# Patient Record
Sex: FEMALE | Race: WHITE | NOT HISPANIC OR LATINO | Employment: UNEMPLOYED
[De-identification: names, ages, dates, MRNs, and addresses within clinical notes are randomized per-mention and may not be internally consistent; named-entity substitution may affect disease eponyms.]

---

## 2017-09-24 ENCOUNTER — HEALTH MAINTENANCE LETTER (OUTPATIENT)
Age: 27
End: 2017-09-24

## 2019-03-27 ENCOUNTER — OFFICE VISIT (OUTPATIENT)
Dept: FAMILY MEDICINE | Facility: CLINIC | Age: 29
End: 2019-03-27

## 2019-03-27 VITALS
BODY MASS INDEX: 25.54 KG/M2 | DIASTOLIC BLOOD PRESSURE: 66 MMHG | TEMPERATURE: 96.3 F | SYSTOLIC BLOOD PRESSURE: 108 MMHG | HEART RATE: 87 BPM | WEIGHT: 135.3 LBS | RESPIRATION RATE: 18 BRPM | HEIGHT: 61 IN | OXYGEN SATURATION: 99 %

## 2019-03-27 DIAGNOSIS — E06.3 HASHIMOTO'S THYROIDITIS: Primary | ICD-10-CM

## 2019-03-27 DIAGNOSIS — F32.A ANXIETY AND DEPRESSION: ICD-10-CM

## 2019-03-27 DIAGNOSIS — F41.9 ANXIETY AND DEPRESSION: ICD-10-CM

## 2019-03-27 LAB
T4 FREE SERPL-MCNC: 1.02 NG/DL (ref 0.76–1.46)
TSH SERPL DL<=0.005 MIU/L-ACNC: 2.21 MU/L (ref 0.4–4)

## 2019-03-27 PROCEDURE — 36415 COLL VENOUS BLD VENIPUNCTURE: CPT | Performed by: NURSE PRACTITIONER

## 2019-03-27 PROCEDURE — 84439 ASSAY OF FREE THYROXINE: CPT | Performed by: NURSE PRACTITIONER

## 2019-03-27 PROCEDURE — 99203 OFFICE O/P NEW LOW 30 MIN: CPT | Performed by: NURSE PRACTITIONER

## 2019-03-27 PROCEDURE — 84443 ASSAY THYROID STIM HORMONE: CPT | Performed by: NURSE PRACTITIONER

## 2019-03-27 RX ORDER — SERTRALINE HYDROCHLORIDE 100 MG/1
TABLET, FILM COATED ORAL
Qty: 45 TABLET | Refills: 3 | Status: SHIPPED | OUTPATIENT
Start: 2019-03-27 | End: 2024-01-09

## 2019-03-27 ASSESSMENT — ANXIETY QUESTIONNAIRES
3. WORRYING TOO MUCH ABOUT DIFFERENT THINGS: SEVERAL DAYS
6. BECOMING EASILY ANNOYED OR IRRITABLE: MORE THAN HALF THE DAYS
2. NOT BEING ABLE TO STOP OR CONTROL WORRYING: MORE THAN HALF THE DAYS
GAD7 TOTAL SCORE: 14
7. FEELING AFRAID AS IF SOMETHING AWFUL MIGHT HAPPEN: MORE THAN HALF THE DAYS
IF YOU CHECKED OFF ANY PROBLEMS ON THIS QUESTIONNAIRE, HOW DIFFICULT HAVE THESE PROBLEMS MADE IT FOR YOU TO DO YOUR WORK, TAKE CARE OF THINGS AT HOME, OR GET ALONG WITH OTHER PEOPLE: SOMEWHAT DIFFICULT
5. BEING SO RESTLESS THAT IT IS HARD TO SIT STILL: NEARLY EVERY DAY
1. FEELING NERVOUS, ANXIOUS, OR ON EDGE: SEVERAL DAYS

## 2019-03-27 ASSESSMENT — MIFFLIN-ST. JEOR: SCORE: 1271.1

## 2019-03-27 ASSESSMENT — PATIENT HEALTH QUESTIONNAIRE - PHQ9
SUM OF ALL RESPONSES TO PHQ QUESTIONS 1-9: 17
5. POOR APPETITE OR OVEREATING: NEARLY EVERY DAY

## 2019-03-27 NOTE — PROGRESS NOTES
"  SUBJECTIVE:   Mary Morrison is a 28 year old female who presents to clinic today for the following health issues:    Chief Complaint   Patient presents with     [facility] Care     Just moved back from [place], has Hashimotos, has not been on her levothyroxine, liothyrionine or zoloft  for the past 3 months      Refill Request     Zoloft      Depression and Anxiety Follow-Up    Status since last visit: Worsened- has been off of Zoloft for 3 months, has been crying a lot and having mood swings     Other associated symptoms:None    Complicating factors:     Significant life event: Yes-  Moving      Current substance abuse: None    PHQ 3/27/2019   PHQ-9 Total Score 17   Q9: Suicide Ideation Not at all     CHARLINE-7 SCORE 12/15/2011 3/27/2019   Total Score 13 -   Total Score - 14     In the past two weeks have you had thoughts of suicide or self-harm?  No.    Do you have concerns about your personal safety or the safety of others?   No  PHQ-9  English  PHQ-9   Any Language  CHARLINE-7  Suicide Assessment Five-step Evaluation and Treatment (SAFE-T)    Amount of exercise or physical activity: 6-7 days/week for an average of greater than 60 minutes    Problems taking medications regularly: Yes,  Has been out of her meds     Medication side effects: none    Diet: regular (no restrictions)    Problem list and histories reviewed & adjusted, as indicated.  Additional history: as documented    Labs reviewed in EPIC    Reviewed and updated as needed this visit by clinical staff  Tobacco  Allergies  Meds  Med Hx  Surg Hx  Fam Hx  Soc Hx      Reviewed and updated as needed this visit by Provider         ROS:  Constitutional, HEENT, cardiovascular, pulmonary, gi and gu systems are negative, except as otherwise noted.    OBJECTIVE:     /66 (BP Location: Left arm, Patient Position: Sitting, Cuff Size: Adult Regular)   Pulse 87   Temp 96.3  F (35.7  C) (Tympanic)   Resp 18   Ht 1.549 m (5' 1\")   Wt 61.4 kg (135 lb 4.8 oz)  "  LMP  (LMP Unknown)   SpO2 99%   BMI 25.56 kg/m    Body mass index is 25.56 kg/m .  GENERAL: healthy, alert and no distress  CV: regular rate and rhythm, normal S1 S2, no S3 or S4, no murmur, click or rub, no peripheral edema and peripheral pulses strong  PSYCH: mentation appears normal, affect normal/bright    Diagnostic Test Results:  Results for orders placed or performed in visit on 03/27/19 (from the past 24 hour(s))   TSH   Result Value Ref Range    TSH 2.21 0.40 - 4.00 mU/L   T4, free   Result Value Ref Range    T4 Free 1.02 0.76 - 1.46 ng/dL       ASSESSMENT/PLAN:     1. Hashimoto's thyroiditis  -labs are normal, was on Synthroid 112 mcg, but stopped about 3 months ago because she run out, clinically euthyroid except for symptoms of dry skin   -no need for current replacement, recommended to repeat thyroid levels in 2 months and follow up with endocrinologist   - TSH  - T4, free  - ENDOCRINOLOGY ADULT REFERRAL  - TSH with free T4 reflex FUTURE 2mo; Future    2. Anxiety and depression  -restart Zoloft  - sertraline (ZOLOFT) 100 MG tablet; Take 50 mg for 1 week, than increase to 100 mg for 2 weeks and than start 150 mg daily  Dispense: 45 tablet; Refill: 3    See Patient Instructions    AISHA Lujan [facility]

## 2019-03-28 ASSESSMENT — ANXIETY QUESTIONNAIRES: GAD7 TOTAL SCORE: 14

## 2019-09-16 ENCOUNTER — TELEPHONE (OUTPATIENT)
Dept: FAMILY MEDICINE | Facility: CLINIC | Age: 29
End: 2019-09-16

## 2019-09-16 NOTE — LETTER
September 18, 2019      Mary Morrison  [id number]  [place] [facility] [address]        Dear Mary,     We have been unable to reach you by phone, It appears the phone number we have listed to reach you is non working.  Your [facility] Team works hard to make sure that you receive exceptional care. Enclosed you will find a copy of the phq-9/gad7 depression form  that our clinic uses to monitor and manage your Depression/anxiety  This test is an assessment tool that we can use to determine how well your Depression  is controlled. Please fill out and mail back the enclosed  form. Enclosed is a stamped addressed envelope for you to mail the form  back to the clinic in.Thank You     Also , your provider would like you to schedule a lab appointment to have your thyroid labs re checked. This can be scheduled by calling [phone].     In order to ensure we are providing the best quality care, we have reviewed your chart and see that you are due for: A pappe/physical.   Please call the clinic at your earliest convenience to schedule an appointment, and update your current contact information.     If you are receiving your care at another facility or have had this test done elsewhere,  please let us know so we can update our records.        Sincerely,        Kacey Anthony MD

## 2019-09-16 NOTE — TELEPHONE ENCOUNTER
"  Panel Management Review      Patient has the following on her problem list:     Depression / Dysthymia review    Measure:  Needs PHQ-9 score of 4 or less during index window.  Administer PHQ-9 and if score is 5 or more, send encounter to provider for next steps.    4 -8 month window range: 7/29/19-11/26/19    PHQ-9 SCORE 9/6/2011 12/15/2011 3/27/2019   PHQ-9 Total Score 2 8 -   PHQ-9 Total Score - - 17       If PHQ-9 recheck is 5 or more, route to provider for next steps.    Patient is due for:  PHQ9      Composite cancer screening  Chart review shows that this patient is due/due soon for the following None  Summary:    Patient is due/failing the following:   PHQ9    Action needed:   Patient needs to do PHQ9.  Patient is due to come in for thyroid labs.    Type of outreach:    No answer at number listed, \"number not taking calls\".    Questions for provider review:    None                                                                                                                                    RICHAR Alberto MA       Chart routed to Care Team .          "

## 2019-09-18 NOTE — TELEPHONE ENCOUNTER
Panel Management Review      Patient has the following on her problem list:     Depression / Dysthymia review    Measure:  Needs PHQ-9 score of 4 or less during index window.  Administer PHQ-9 and if score is 5 or more, send encounter to provider for next steps.    4 -8 month window range: 7/29/19-11/26/19    PHQ-9 SCORE 9/6/2011 12/15/2011 3/27/2019   PHQ-9 Total Score 2 8 -   PHQ-9 Total Score - - 17       If PHQ-9 recheck is 5 or more, route to provider for next steps.    Patient is due for:  PHQ9      Composite cancer screening  Chart review shows that this patient is due/due soon for the following Pap Smear  Summary:    Patient is due/failing the following:   Thyroid labs , PAP and PHQ9      Type of outreach:    Attempted to reach patient at # listed. # not taking calls.Letter was sent with phq-9/gad7 enclosed for her to fill out and mail back . ALso mailed lab reminders.    Jono SHETH CMA

## 2019-11-09 ENCOUNTER — HEALTH MAINTENANCE LETTER (OUTPATIENT)
Age: 29
End: 2019-11-09

## 2020-02-23 ENCOUNTER — HEALTH MAINTENANCE LETTER (OUTPATIENT)
Age: 30
End: 2020-02-23

## 2020-12-06 ENCOUNTER — HEALTH MAINTENANCE LETTER (OUTPATIENT)
Age: 30
End: 2020-12-06

## 2021-09-26 ENCOUNTER — HEALTH MAINTENANCE LETTER (OUTPATIENT)
Age: 31
End: 2021-09-26

## 2022-01-15 ENCOUNTER — HEALTH MAINTENANCE LETTER (OUTPATIENT)
Age: 32
End: 2022-01-15

## 2023-04-23 ENCOUNTER — HEALTH MAINTENANCE LETTER (OUTPATIENT)
Age: 33
End: 2023-04-23

## 2023-11-21 ENCOUNTER — HOSPITAL ENCOUNTER (EMERGENCY)
Facility: CLINIC | Age: 33
Discharge: HOME | End: 2023-11-21
Admitting: EMERGENCY MEDICINE
Payer: MEDICAID

## 2023-11-21 ENCOUNTER — HOSPITAL ENCOUNTER (EMERGENCY)
Facility: CLINIC | Age: 33
End: 2023-11-21
Payer: MEDICAID

## 2023-11-21 VITALS
HEIGHT: 61 IN | DIASTOLIC BLOOD PRESSURE: 89 MMHG | TEMPERATURE: 97.9 F | WEIGHT: 200 LBS | BODY MASS INDEX: 37.76 KG/M2 | OXYGEN SATURATION: 100 % | HEART RATE: 75 BPM | SYSTOLIC BLOOD PRESSURE: 132 MMHG | RESPIRATION RATE: 18 BRPM

## 2023-11-21 PROCEDURE — 99281 EMR DPT VST MAYX REQ PHY/QHP: CPT

## 2023-11-21 NOTE — ED TRIAGE NOTES
Patient reports she was seen in clinic ~ 2 months ago for same complaint, was to have U/S but did not r/t insurance issues. Patient reports sx continue. Sx include urinary incontinence, diarrhea. Just prior to bowel movement and urination feels stabbing pain and bloating.     Triage Assessment (Adult)       Row Name 11/21/23 1253          Triage Assessment    Airway WDL WDL        Respiratory WDL    Respiratory WDL WDL        Skin Circulation/Temperature WDL    Skin Circulation/Temperature WDL WDL        Cardiac WDL    Cardiac WDL WDL        Peripheral/Neurovascular WDL    Peripheral Neurovascular WDL WDL        Cognitive/Neuro/Behavioral WDL    Cognitive/Neuro/Behavioral WDL WDL

## 2023-11-22 ENCOUNTER — APPOINTMENT (OUTPATIENT)
Dept: CT IMAGING | Facility: CLINIC | Age: 33
End: 2023-11-22
Attending: EMERGENCY MEDICINE
Payer: MEDICAID

## 2023-11-22 ENCOUNTER — HOSPITAL ENCOUNTER (EMERGENCY)
Facility: CLINIC | Age: 33
Discharge: HOME | End: 2023-11-22
Attending: EMERGENCY MEDICINE | Admitting: EMERGENCY MEDICINE
Payer: MEDICAID

## 2023-11-22 VITALS
RESPIRATION RATE: 18 BRPM | OXYGEN SATURATION: 97 % | WEIGHT: 196.8 LBS | TEMPERATURE: 97.5 F | HEART RATE: 70 BPM | HEIGHT: 60 IN | SYSTOLIC BLOOD PRESSURE: 123 MMHG | BODY MASS INDEX: 38.64 KG/M2 | DIASTOLIC BLOOD PRESSURE: 81 MMHG

## 2023-11-22 DIAGNOSIS — R10.30 LOWER ABDOMINAL PAIN: ICD-10-CM

## 2023-11-22 DIAGNOSIS — N30.00 ACUTE CYSTITIS WITHOUT HEMATURIA: ICD-10-CM

## 2023-11-22 LAB
ALBUMIN SERPL BCG-MCNC: 4.3 G/DL (ref 3.5–5.2)
ALBUMIN UR-MCNC: 30 MG/DL
ALP SERPL-CCNC: 88 U/L (ref 40–150)
ALT SERPL W P-5'-P-CCNC: 14 U/L (ref 0–50)
ANION GAP SERPL CALCULATED.3IONS-SCNC: 15 MMOL/L (ref 7–15)
APPEARANCE UR: CLEAR
AST SERPL W P-5'-P-CCNC: 12 U/L (ref 0–45)
BILIRUB SERPL-MCNC: 0.2 MG/DL
BILIRUB UR QL STRIP: NEGATIVE
BUN SERPL-MCNC: 5.3 MG/DL (ref 6–20)
CALCIUM SERPL-MCNC: 9.2 MG/DL (ref 8.6–10)
CHLORIDE SERPL-SCNC: 102 MMOL/L (ref 98–107)
COLOR UR AUTO: YELLOW
CREAT SERPL-MCNC: 0.78 MG/DL (ref 0.51–0.95)
DEPRECATED HCO3 PLAS-SCNC: 21 MMOL/L (ref 22–29)
EGFRCR SERPLBLD CKD-EPI 2021: >90 ML/MIN/1.73M2
GLUCOSE SERPL-MCNC: 92 MG/DL (ref 70–99)
GLUCOSE UR STRIP-MCNC: NEGATIVE MG/DL
HGB UR QL STRIP: NEGATIVE
HOLD SPECIMEN: NORMAL
KETONES UR STRIP-MCNC: 80 MG/DL
LEUKOCYTE ESTERASE UR QL STRIP: NEGATIVE
LIPASE SERPL-CCNC: 12 U/L (ref 13–60)
MUCOUS THREADS #/AREA URNS LPF: PRESENT /LPF
NITRATE UR QL: NEGATIVE
PH UR STRIP: 6 [PH] (ref 5–7)
POTASSIUM SERPL-SCNC: 3.6 MMOL/L (ref 3.4–5.3)
PROT SERPL-MCNC: 7.5 G/DL (ref 6.4–8.3)
RBC URINE: 2 /HPF
SODIUM SERPL-SCNC: 138 MMOL/L (ref 135–145)
SP GR UR STRIP: 1.02 (ref 1–1.03)
SQUAMOUS EPITHELIAL: <1 /HPF
UROBILINOGEN UR STRIP-MCNC: 2 MG/DL
WBC URINE: 1 /HPF

## 2023-11-22 PROCEDURE — 96360 HYDRATION IV INFUSION INIT: CPT | Performed by: EMERGENCY MEDICINE

## 2023-11-22 PROCEDURE — 87086 URINE CULTURE/COLONY COUNT: CPT | Performed by: EMERGENCY MEDICINE

## 2023-11-22 PROCEDURE — 250N000011 HC RX IP 250 OP 636: Performed by: EMERGENCY MEDICINE

## 2023-11-22 PROCEDURE — 83690 ASSAY OF LIPASE: CPT | Mod: 59 | Performed by: EMERGENCY MEDICINE

## 2023-11-22 PROCEDURE — 99284 EMERGENCY DEPT VISIT MOD MDM: CPT | Performed by: EMERGENCY MEDICINE

## 2023-11-22 PROCEDURE — 36415 COLL VENOUS BLD VENIPUNCTURE: CPT | Performed by: EMERGENCY MEDICINE

## 2023-11-22 PROCEDURE — 74177 CT ABD & PELVIS W/CONTRAST: CPT

## 2023-11-22 PROCEDURE — 99285 EMERGENCY DEPT VISIT HI MDM: CPT | Mod: 25 | Performed by: EMERGENCY MEDICINE

## 2023-11-22 PROCEDURE — 81001 URINALYSIS AUTO W/SCOPE: CPT | Performed by: EMERGENCY MEDICINE

## 2023-11-22 PROCEDURE — 258N000003 HC RX IP 258 OP 636: Performed by: EMERGENCY MEDICINE

## 2023-11-22 PROCEDURE — 250N000011 HC RX IP 250 OP 636: Performed by: STUDENT IN AN ORGANIZED HEALTH CARE EDUCATION/TRAINING PROGRAM

## 2023-11-22 PROCEDURE — 250N000009 HC RX 250: Performed by: EMERGENCY MEDICINE

## 2023-11-22 PROCEDURE — 96361 HYDRATE IV INFUSION ADD-ON: CPT | Performed by: EMERGENCY MEDICINE

## 2023-11-22 PROCEDURE — 80053 COMPREHEN METABOLIC PANEL: CPT | Mod: 59 | Performed by: EMERGENCY MEDICINE

## 2023-11-22 RX ORDER — ONDANSETRON 4 MG/1
4 TABLET, ORALLY DISINTEGRATING ORAL EVERY 8 HOURS PRN
Qty: 10 TABLET | Refills: 0 | Status: SHIPPED | OUTPATIENT
Start: 2023-11-22 | End: 2024-01-09

## 2023-11-22 RX ORDER — ONDANSETRON 4 MG/1
4 TABLET, ORALLY DISINTEGRATING ORAL ONCE
Status: COMPLETED | OUTPATIENT
Start: 2023-11-22 | End: 2023-11-22

## 2023-11-22 RX ORDER — IOPAMIDOL 755 MG/ML
96 INJECTION, SOLUTION INTRAVASCULAR ONCE
Status: COMPLETED | OUTPATIENT
Start: 2023-11-22 | End: 2023-11-22

## 2023-11-22 RX ORDER — OXYCODONE HYDROCHLORIDE 5 MG/1
5 TABLET ORAL EVERY 6 HOURS PRN
Qty: 6 TABLET | Refills: 0 | Status: SHIPPED | OUTPATIENT
Start: 2023-11-22 | End: 2024-01-09

## 2023-11-22 RX ORDER — CEPHALEXIN 500 MG/1
1000 CAPSULE ORAL 2 TIMES DAILY
Qty: 20 CAPSULE | Refills: 0 | Status: SHIPPED | OUTPATIENT
Start: 2023-11-22 | End: 2024-01-09

## 2023-11-22 RX ADMIN — SODIUM CHLORIDE 64 ML: 9 INJECTION, SOLUTION INTRAVENOUS at 20:29

## 2023-11-22 RX ADMIN — SODIUM CHLORIDE, POTASSIUM CHLORIDE, SODIUM LACTATE AND CALCIUM CHLORIDE 1000 ML: 600; 310; 30; 20 INJECTION, SOLUTION INTRAVENOUS at 19:50

## 2023-11-22 RX ADMIN — ONDANSETRON 4 MG: 4 TABLET, ORALLY DISINTEGRATING ORAL at 19:39

## 2023-11-22 RX ADMIN — IOPAMIDOL 96 ML: 755 INJECTION, SOLUTION INTRAVENOUS at 20:29

## 2023-11-22 ASSESSMENT — ACTIVITIES OF DAILY LIVING (ADL): ADLS_ACUITY_SCORE: 35

## 2023-11-23 NOTE — DISCHARGE INSTRUCTIONS
I am not certain what is causing her pain.  It may be related to a bladder infection.  I am going to treat you with antibiotics for the next 5 days.  Return for worsening symptoms, repeated vomiting, or other concerns.  Otherwise follow-up in clinic.    Use ibuprofen and acetaminophen for your symptoms.  Use oxycodone as needed for pain that is not controlled by the prior two medications.    Oxycodone is an addictive opioid medication, please only take it when the pain is more than can be controlled by acetaminophen or ibuprofen alone. It will also make you lightheaded, nauseated, and constipated.  Do not drive, operate heavy machinery, or take care of young children while taking this medication. Do not mix it with other medications or drugs that will make you sleepy, such as alcohol.     Repeated studies have shown that the longer you use opioid pain medications, the longer it is until you return to normal function. It is our recommendation that you taper off opioids as quickly as possible with the goal of returning to normal function or near normal function. Long term use of opioids quickly results in growing tolerance to the medication (less of the benefits) and increased dependence (more of the bad side effects).     Pain is very difficult to treat and we can very rarely take away pain completely. If you are having difficulty with pain over several weeks after an injury, you may need to start different medications and therapies (such as physical therapy, graded exercise, massage, and acupuncture). Please talk about this with your regular doctor.     If you are interested in seeking free, confidential treatment referral and information service for individuals and families facing mental health and/or substance use disorders please call [phone]-Certify ([phone])

## 2023-11-23 NOTE — ED TRIAGE NOTES
"Has had \"weeks and weeks\" of loose stools over the past 3 days has had pain nausea vomiting and more loose stools.  Went to doctor today at [facility] had xray and labs \"nothing found\" got home had more vomiting and  insisted she come in.    Reports some abd discomfort all the time when she tries to go to the bathroom has sharp stabbing cramp from belly button down that is \"excruciating\"      Moving about freely walking fully upright texting on phone during triage    Triage Assessment (Adult)       Row Name 11/22/23 1360          Triage Assessment    Airway WDL WDL        Respiratory WDL    Respiratory WDL WDL        Skin Circulation/Temperature WDL    Skin Circulation/Temperature WDL WDL        Cardiac WDL    Cardiac WDL WDL        Peripheral/Neurovascular WDL    Peripheral Neurovascular WDL WDL        Cognitive/Neuro/Behavioral WDL    Cognitive/Neuro/Behavioral WDL WDL                     "

## 2023-11-23 NOTE — ED PROVIDER NOTES
History     Chief Complaint   Patient presents with    GI Problem     HPI  Mary Morrison is a 33 year old female who presents for abdominal pain.  The patient says that she has been having sharp stabbing severe abdominal pain off and on over the past several days.  Located in the lower abdomen.  Pain is often triggered by needing to have a bowel movement or urinates.  No fevers.  She does have nausea and has had several episodes of vomiting.  She reports nonbloody loose stools.  No dysuria but she has urinary frequency and urgency.  No chest pain or difficulty breathing or cough.    I reviewed the patient's clinic visit from 11/22/2023, earlier today.  She had normal electrolytes and LFTs.  Normal lipase and amylase.  Normal CRP.  White blood cell count of 10.1.  Urinalysis without definite signs of infection.  Urine pregnancy test was negative.    Allergies:  Allergies   Allergen Reactions    Ketorolac Tromethamine Other (See Comments)     Comment: vomiting , Description:     Morphine Other (See Comments)     Comment: Confusion, Description:        Problem List:    Patient Active Problem List    Diagnosis Date Noted    Anxiety 08/01/2011     Priority: Medium    Psychiatric disorders 07/23/2011     Priority: Medium     1. Major depressive disorder, recurrent, severe without psychotic features. 2. Alcohol dependency versus abuse.   AXIS II: Borderline personality disorder.   AXIS III: Self-inflicted cuts on wrist.   AXIS IV: Long history of mental illness.   AXIS V: Current Global Assessment of Functioning of 50.  Problem list name updated by automated process. Provider to review and confirm      Health Care Home 04/19/2011     Priority: Medium     High priority patient - 4/19/11    DX V65.8 REPLACED WITH [id number] C[facility] HOME (04/08/2013)      CARDIOVASCULAR SCREENING; LDL GOAL LESS THAN 160 03/22/2011     Priority: Medium    Thyroid enlargement 01/06/2011     Priority: Medium    Knee pain 09/28/2010      Priority: Medium    LGSIL on Pap smear 2010     Priority: Medium     09:Pap--LSIL. Repeat pap PP. (ALAYNA 7/17/10).  8/23/10:pap--LSIL. Recommend colposcopy.  9/21/10:Colpo--benign. Repeat pap 6 months. Reminder placed in EPIC.  11: Pap - NIL. Repeat pap postpartum  11:Pap--NIL. Rpt pap in 1 year with [facility]. In HM, ep, prob list, hx updated. Reminder sent.    13: Patient failed follow-up. Pap tracking file closed.               Past Medical History:    Past Medical History:   Diagnosis Date    Anxiety     Chickenpox     Papanicolaou smear of cervix with low grade squamous intraepithelial lesion (LGSIL) ,     Urinary tract infections        Past Surgical History:    Past Surgical History:   Procedure Laterality Date    ARTHROSCOPY OF JOINT UNLISTED       SECTION  2011    Procedure: SECTION;  Section; Surgeon:ALIZA AMADOR; Location:[place] OR     TOOTH EXTRACTION W/FORCEP         Family History:    Family History   Problem Relation Age of Onset    C.A.D. Father         stents    Hypertension Father     Cancer Father         testicular cancer    Alcohol/Drug Father         alcohol & drug    Depression Father     Genitourinary Problems Father         kidney stone history    Psychotic Disorder Father     Gynecology Mother         hysterectomy-endometreosis    Hypertension Maternal Grandmother     Breast Cancer Maternal Grandmother     Arthritis Maternal Grandmother     Gynecology Maternal Grandmother         hysterectomy    Hypertension Maternal Grandfather     Arthritis Maternal Grandfather     Arthritis Paternal Grandmother     Osteoporosis Paternal Grandmother     Asthma Paternal Grandfather     Hypertension Paternal Grandfather     Arthritis Paternal Grandfather     Lipids Paternal Grandfather     Obesity Paternal Grandfather     Respiratory Paternal Grandfather         COPD    Cancer - colorectal Paternal Grandfather     Asthma Brother        Social  History:  Marital Status:  Single [1]  Social History     Tobacco Use    Smoking status: Every Day     Packs/day: 0.50     Years: 2.00     Additional pack years: 0.00     Total pack years: 1.00     Types: Cigarettes    Smokeless tobacco: Never    Tobacco comments:     10-12 cigarettes a day    Substance Use Topics    Alcohol use: No    Drug use: No        Medications:    cephALEXin (KEFLEX) 500 MG capsule  ondansetron (ZOFRAN ODT) 4 MG ODT tab  oxyCODONE (ROXICODONE) 5 MG tablet  sertraline (ZOLOFT) 100 MG tablet          Review of Systems    Physical Exam   BP: 120/79  Pulse: 79  Temp: 97.5  F (36.4  C)  Resp: 18  Height: 152.4 cm (5')  Weight: 89.3 kg (196 lb 12.8 oz)  SpO2: 97 %      Physical Exam  Constitutional:       General: She is in acute distress.      Appearance: Normal appearance. She is well-developed.   HENT:      Head: Normocephalic and atraumatic.   Eyes:      General: No scleral icterus.     Conjunctiva/sclera: Conjunctivae normal.   Cardiovascular:      Rate and Rhythm: Normal rate.   Pulmonary:      Effort: Pulmonary effort is normal. No respiratory distress.   Abdominal:      General: Abdomen is flat.      Tenderness: There is abdominal tenderness. There is no guarding or rebound.   Musculoskeletal:      Cervical back: Normal range of motion and neck supple.   Skin:     General: Skin is warm and dry.      Findings: No rash.   Neurological:      Mental Status: She is alert and oriented to person, place, and time.         ED Course                 Procedures              Critical Care time:  none               Results for orders placed or performed during the hospital encounter of 11/22/23 (from the past 24 hour(s))   [facility] Draw    Narrative    The following orders were created for panel order [facility] Draw.  Procedure                               Abnormality         Status                     ---------                               -----------         ------                     Extra Blue Top  Tube[[id number]]                              Final result               Extra Green Top (Lithium...[[id number]]                      Final result               Extra Purple Top Tube[[id number]]                            Final result                 Please view results for these tests on the individual orders.   Extra Blue Top Tube   Result Value Ref Range    Hold Specimen JIC    Extra Green Top (Lithium Heparin) Tube   Result Value Ref Range    Hold Specimen JIC    Extra Purple Top Tube   Result Value Ref Range    Hold Specimen JIC    Lipase   Result Value Ref Range    Lipase 12 (L) 13 - 60 U/L   Comprehensive metabolic panel   Result Value Ref Range    Sodium 138 135 - 145 mmol/L    Potassium 3.6 3.4 - 5.3 mmol/L    Carbon Dioxide (CO2) 21 (L) 22 - 29 mmol/L    Anion Gap 15 7 - 15 mmol/L    Urea Nitrogen 5.3 (L) 6.0 - 20.0 mg/dL    Creatinine 0.78 0.51 - 0.95 mg/dL    GFR Estimate >90 >60 mL/min/1.73m2    Calcium 9.2 8.6 - 10.0 mg/dL    Chloride 102 98 - 107 mmol/L    Glucose 92 70 - 99 mg/dL    Alkaline Phosphatase 88 40 - 150 U/L    AST 12 0 - 45 U/L    ALT 14 0 - 50 U/L    Protein Total 7.5 6.4 - 8.3 g/dL    Albumin 4.3 3.5 - 5.2 g/dL    Bilirubin Total 0.2 <=1.2 mg/dL   CT Abdomen Pelvis w Contrast    Narrative    EXAM: CT ABDOMEN PELVIS W CONTRAST  LOCATION: [facility]  DATE: 11/22/2023    INDICATION: left lower abdominal pain  COMPARISON: CT 10/12/2020  TECHNIQUE: CT scan of the abdomen and pelvis was performed following injection of IV contrast. Multiplanar reformats were obtained. Dose reduction techniques were used.  CONTRAST: 96mL Isovue 370    FINDINGS:   LOWER CHEST: Normal.    HEPATOBILIARY: Normal variant localized fatty infiltration in the left lobe liver near the ligament of teres. The liver is otherwise normal. The gallbladder, portal veins, and bile ducts are normal.    PANCREAS: Normal.    SPLEEN: Normal.    ADRENAL GLANDS: Normal.    KIDNEYS/BLADDER: There is slight  circumferential thickening with mild stranding adjacent fat involving the bladder wall which is nonspecific, can be seen with findings of cystitis and if this is of concern clinically then a urinalysis would be of   benefit. Minute simple cyst left kidney with no follow-up recommended. Persistent fetal lobulation seen in the both kidneys which is a normal variant.    BOWEL: Normal to include the appendix.    LYMPH NODES: Normal.    VASCULATURE: Unremarkable.    PELVIC ORGANS: There are 3 tiny cysts seen in the right ovary with the largest measuring 1.1 cm in greatest dimension. These be most typical for follicles. The uterus and left ovary are normal.    MUSCULOSKELETAL: Minute fatty umbilical hernia with no associated inflammation or bowel.      Impression    IMPRESSION:   1.  Mild circumferential thickening with slight stranding adjacent fat involving the bladder, if changes of cystitis is of concern, then a urinalysis is recommended for further evaluation.    2.  3 tiny cysts in the right ovary most typical for follicles.    3.  Normal variant localized fatty infiltration in the left lobe liver  4.  minute teres.   Urine Macroscopic with reflex to Microscopic   Result Value Ref Range    Color Urine Yellow Colorless, Straw, Light Yellow, Yellow    Appearance Urine Clear Clear    Glucose Urine Negative Negative mg/dL    Bilirubin Urine Negative Negative    Ketones Urine 80 (A) Negative mg/dL    Specific Gravity Urine 1.020 1.003 - 1.035    Blood Urine Negative Negative    pH Urine 6.0 5.0 - 7.0    Protein Albumin Urine 30 (A) Negative mg/dL    Urobilinogen Urine 2.0 Normal, 2.0 mg/dL    Nitrite Urine Negative Negative    Leukocyte Esterase Urine Negative Negative    RBC Urine 2 <=2 /HPF    WBC Urine 1 <=5 /HPF    Squamous Epithelials Urine <1 <=1 /HPF    Mucus Urine Present (A) None Seen /LPF       Medications   ondansetron (ZOFRAN ODT) ODT tab 4 mg (4 mg Oral $Given 11/22/23 1939)   lactated ringers BOLUS 1,000 mL  (1,000 mLs Intravenous $New Bag 11/22/23 1950)   iopamidol (ISOVUE-370) solution 96 mL (96 mLs Intravenous $Given 11/22/23 2029)   sodium chloride 0.9 % bag 500mL for CT scan flush use (64 mLs Intravenous $Given 11/22/23 2029)       Assessments & Plan (with Medical Decision Making)   33-year-old female presents for abdominal pain.  Vital signs are reassuring.  She is given IV ondansetron and fluids.  Urinalysis is positive for ketones.  Otherwise no definite signs of infection.  Urine culture is pending.  Electrolytes are within normal limits.  LFTs are normal, no signs of hepatitis.  Lipase is normal, no signs of pancreatitis.  CT of the abdomen and pelvis obtained, images interpreted independently as well as radiology read reviewed, no signs of bowel obstruction, diverticulitis, or appendicitis.  She does have some inflammation in thickening of her bladder wall, possibly related to cystitis.  Given the patient's discomfort, her urinary urgency and frequency and the findings on the CT scan I will trial treatment for acute cystitis with cephalexin.  She is safe to discharge home with a prescription for cephalexin as well as a short course of oxycodone and ondansetron.  She is told to return if worse, otherwise follow-up in clinic.  The patient is in agreement with this plan.    I have reviewed the nursing notes.    I have reviewed the findings, diagnosis, plan and need for follow up with the patient.         New Prescriptions    CEPHALEXIN (KEFLEX) 500 MG CAPSULE    Take 2 capsules (1,000 mg) by mouth 2 times daily    ONDANSETRON (ZOFRAN ODT) 4 MG ODT TAB    Take 1 tablet (4 mg) by mouth every 8 hours as needed for nausea    OXYCODONE (ROXICODONE) 5 MG TABLET    Take 1 tablet (5 mg) by mouth every 6 hours as needed for severe pain       Final diagnoses:   Acute cystitis without hematuria   Lower abdominal pain       11/22/2023   [facility] EMERGENCY DEPT       Danny Bermudez MD  11/22/23  [id number]

## 2023-11-24 LAB — BACTERIA UR CULT: NORMAL

## 2023-12-27 ENCOUNTER — APPOINTMENT (OUTPATIENT)
Dept: CT IMAGING | Facility: CLINIC | Age: 33
End: 2023-12-27
Attending: FAMILY MEDICINE
Payer: COMMERCIAL

## 2023-12-27 ENCOUNTER — HOSPITAL ENCOUNTER (EMERGENCY)
Facility: CLINIC | Age: 33
Discharge: HOME | End: 2023-12-27
Attending: FAMILY MEDICINE | Admitting: FAMILY MEDICINE
Payer: COMMERCIAL

## 2023-12-27 VITALS
BODY MASS INDEX: 38.08 KG/M2 | TEMPERATURE: 98.2 F | HEART RATE: 75 BPM | RESPIRATION RATE: 18 BRPM | OXYGEN SATURATION: 96 % | SYSTOLIC BLOOD PRESSURE: 101 MMHG | WEIGHT: 195 LBS | DIASTOLIC BLOOD PRESSURE: 58 MMHG

## 2023-12-27 DIAGNOSIS — K52.9 COLITIS: ICD-10-CM

## 2023-12-27 LAB
ALBUMIN SERPL BCG-MCNC: 4.3 G/DL (ref 3.5–5.2)
ALBUMIN UR-MCNC: 30 MG/DL
ALP SERPL-CCNC: 121 U/L (ref 40–150)
ALT SERPL W P-5'-P-CCNC: 20 U/L (ref 0–50)
ANION GAP SERPL CALCULATED.3IONS-SCNC: 14 MMOL/L (ref 7–15)
APPEARANCE UR: ABNORMAL
AST SERPL W P-5'-P-CCNC: 18 U/L (ref 0–45)
BACTERIA #/AREA URNS HPF: ABNORMAL /HPF
BASOPHILS # BLD AUTO: 0 10E3/UL (ref 0–0.2)
BASOPHILS NFR BLD AUTO: 0 %
BILIRUB SERPL-MCNC: 0.3 MG/DL
BILIRUB UR QL STRIP: NEGATIVE
BUN SERPL-MCNC: 4.8 MG/DL (ref 6–20)
CALCIUM SERPL-MCNC: 9.4 MG/DL (ref 8.6–10)
CHLORIDE SERPL-SCNC: 103 MMOL/L (ref 98–107)
COLOR UR AUTO: YELLOW
CREAT SERPL-MCNC: 0.72 MG/DL (ref 0.51–0.95)
DEPRECATED HCO3 PLAS-SCNC: 21 MMOL/L (ref 22–29)
EGFRCR SERPLBLD CKD-EPI 2021: >90 ML/MIN/1.73M2
EOSINOPHIL # BLD AUTO: 0.1 10E3/UL (ref 0–0.7)
EOSINOPHIL NFR BLD AUTO: 1 %
ERYTHROCYTE [DISTWIDTH] IN BLOOD BY AUTOMATED COUNT: 14.1 % (ref 10–15)
GLUCOSE SERPL-MCNC: 100 MG/DL (ref 70–99)
GLUCOSE UR STRIP-MCNC: NEGATIVE MG/DL
HCG SERPL QL: NEGATIVE
HCT VFR BLD AUTO: 46.4 % (ref 35–47)
HGB BLD-MCNC: 15.4 G/DL (ref 11.7–15.7)
HGB UR QL STRIP: NEGATIVE
HOLD SPECIMEN: NORMAL
HYALINE CASTS: 3 /LPF
IMM GRANULOCYTES # BLD: 0 10E3/UL
IMM GRANULOCYTES NFR BLD: 0 %
KETONES UR STRIP-MCNC: 5 MG/DL
LEUKOCYTE ESTERASE UR QL STRIP: NEGATIVE
LIPASE SERPL-CCNC: 13 U/L (ref 13–60)
LYMPHOCYTES # BLD AUTO: 0.9 10E3/UL (ref 0.8–5.3)
LYMPHOCYTES NFR BLD AUTO: 8 %
MCH RBC QN AUTO: 32.3 PG (ref 26.5–33)
MCHC RBC AUTO-ENTMCNC: 33.2 G/DL (ref 31.5–36.5)
MCV RBC AUTO: 97 FL (ref 78–100)
MONOCYTES # BLD AUTO: 0.4 10E3/UL (ref 0–1.3)
MONOCYTES NFR BLD AUTO: 3 %
MUCOUS THREADS #/AREA URNS LPF: PRESENT /LPF
NEUTROPHILS # BLD AUTO: 10.1 10E3/UL (ref 1.6–8.3)
NEUTROPHILS NFR BLD AUTO: 88 %
NITRATE UR QL: NEGATIVE
NRBC # BLD AUTO: 0 10E3/UL
NRBC BLD AUTO-RTO: 0 /100
PH UR STRIP: 5 [PH] (ref 5–7)
PLATELET # BLD AUTO: 378 10E3/UL (ref 150–450)
POTASSIUM SERPL-SCNC: 4.6 MMOL/L (ref 3.4–5.3)
PROT SERPL-MCNC: 8.2 G/DL (ref 6.4–8.3)
RBC # BLD AUTO: 4.77 10E6/UL (ref 3.8–5.2)
RBC URINE: 3 /HPF
SODIUM SERPL-SCNC: 138 MMOL/L (ref 135–145)
SP GR UR STRIP: 1.03 (ref 1–1.03)
SQUAMOUS EPITHELIAL: 9 /HPF
UROBILINOGEN UR STRIP-MCNC: 2 MG/DL
WBC # BLD AUTO: 11.5 10E3/UL (ref 4–11)
WBC URINE: 3 /HPF

## 2023-12-27 PROCEDURE — 96374 THER/PROPH/DIAG INJ IV PUSH: CPT | Mod: 59 | Performed by: FAMILY MEDICINE

## 2023-12-27 PROCEDURE — 80053 COMPREHEN METABOLIC PANEL: CPT | Performed by: FAMILY MEDICINE

## 2023-12-27 PROCEDURE — 250N000009 HC RX 250: Performed by: FAMILY MEDICINE

## 2023-12-27 PROCEDURE — 81003 URINALYSIS AUTO W/O SCOPE: CPT | Performed by: FAMILY MEDICINE

## 2023-12-27 PROCEDURE — 99285 EMERGENCY DEPT VISIT HI MDM: CPT | Performed by: FAMILY MEDICINE

## 2023-12-27 PROCEDURE — 250N000011 HC RX IP 250 OP 636: Performed by: FAMILY MEDICINE

## 2023-12-27 PROCEDURE — 81001 URINALYSIS AUTO W/SCOPE: CPT | Performed by: FAMILY MEDICINE

## 2023-12-27 PROCEDURE — 96376 TX/PRO/DX INJ SAME DRUG ADON: CPT | Performed by: FAMILY MEDICINE

## 2023-12-27 PROCEDURE — 85025 COMPLETE CBC W/AUTO DIFF WBC: CPT | Performed by: FAMILY MEDICINE

## 2023-12-27 PROCEDURE — 258N000003 HC RX IP 258 OP 636: Performed by: FAMILY MEDICINE

## 2023-12-27 PROCEDURE — 96361 HYDRATE IV INFUSION ADD-ON: CPT | Performed by: FAMILY MEDICINE

## 2023-12-27 PROCEDURE — 83690 ASSAY OF LIPASE: CPT | Performed by: FAMILY MEDICINE

## 2023-12-27 PROCEDURE — 84703 CHORIONIC GONADOTROPIN ASSAY: CPT | Performed by: FAMILY MEDICINE

## 2023-12-27 PROCEDURE — 36415 COLL VENOUS BLD VENIPUNCTURE: CPT | Performed by: FAMILY MEDICINE

## 2023-12-27 PROCEDURE — 96375 TX/PRO/DX INJ NEW DRUG ADDON: CPT | Performed by: FAMILY MEDICINE

## 2023-12-27 PROCEDURE — 99285 EMERGENCY DEPT VISIT HI MDM: CPT | Mod: 25 | Performed by: FAMILY MEDICINE

## 2023-12-27 PROCEDURE — C9113 INJ PANTOPRAZOLE SODIUM, VIA: HCPCS | Performed by: FAMILY MEDICINE

## 2023-12-27 PROCEDURE — 74177 CT ABD & PELVIS W/CONTRAST: CPT

## 2023-12-27 RX ORDER — ONDANSETRON 4 MG/1
4 TABLET, ORALLY DISINTEGRATING ORAL EVERY 8 HOURS PRN
Qty: 10 TABLET | Refills: 0 | Status: SHIPPED | OUTPATIENT
Start: 2023-12-27 | End: 2024-01-09

## 2023-12-27 RX ORDER — SODIUM CHLORIDE 9 MG/ML
1000 INJECTION, SOLUTION INTRAVENOUS CONTINUOUS
Status: DISCONTINUED | OUTPATIENT
Start: 2023-12-27 | End: 2023-12-27 | Stop reason: HOSPADM

## 2023-12-27 RX ORDER — IOPAMIDOL 755 MG/ML
95 INJECTION, SOLUTION INTRAVASCULAR ONCE
Status: COMPLETED | OUTPATIENT
Start: 2023-12-27 | End: 2023-12-27

## 2023-12-27 RX ORDER — KETOROLAC TROMETHAMINE 15 MG/ML
15 INJECTION, SOLUTION INTRAMUSCULAR; INTRAVENOUS ONCE
Status: COMPLETED | OUTPATIENT
Start: 2023-12-27 | End: 2023-12-27

## 2023-12-27 RX ORDER — OXYCODONE HYDROCHLORIDE 5 MG/1
5 TABLET ORAL EVERY 6 HOURS PRN
Qty: 6 TABLET | Refills: 0 | Status: SHIPPED | OUTPATIENT
Start: 2023-12-27 | End: 2024-01-09

## 2023-12-27 RX ORDER — ONDANSETRON 2 MG/ML
4 INJECTION INTRAMUSCULAR; INTRAVENOUS ONCE
Status: COMPLETED | OUTPATIENT
Start: 2023-12-27 | End: 2023-12-27

## 2023-12-27 RX ORDER — HYDROMORPHONE HYDROCHLORIDE 1 MG/ML
0.5 INJECTION, SOLUTION INTRAMUSCULAR; INTRAVENOUS; SUBCUTANEOUS
Status: DISCONTINUED | OUTPATIENT
Start: 2023-12-27 | End: 2023-12-27 | Stop reason: HOSPADM

## 2023-12-27 RX ADMIN — PANTOPRAZOLE SODIUM 40 MG: 40 INJECTION, POWDER, FOR SOLUTION INTRAVENOUS at 09:55

## 2023-12-27 RX ADMIN — SODIUM CHLORIDE 1000 ML: 9 INJECTION, SOLUTION INTRAVENOUS at 09:55

## 2023-12-27 RX ADMIN — HYDROMORPHONE HYDROCHLORIDE 0.5 MG: 1 INJECTION, SOLUTION INTRAMUSCULAR; INTRAVENOUS; SUBCUTANEOUS at 11:44

## 2023-12-27 RX ADMIN — ONDANSETRON 4 MG: 2 INJECTION INTRAMUSCULAR; INTRAVENOUS at 09:48

## 2023-12-27 RX ADMIN — SODIUM CHLORIDE 64 ML: 9 INJECTION, SOLUTION INTRAVENOUS at 11:24

## 2023-12-27 RX ADMIN — IOPAMIDOL 95 ML: 755 INJECTION, SOLUTION INTRAVENOUS at 11:24

## 2023-12-27 RX ADMIN — KETOROLAC TROMETHAMINE 15 MG: 15 INJECTION, SOLUTION INTRAMUSCULAR; INTRAVENOUS at 13:33

## 2023-12-27 RX ADMIN — HYDROMORPHONE HYDROCHLORIDE 0.5 MG: 1 INJECTION, SOLUTION INTRAMUSCULAR; INTRAVENOUS; SUBCUTANEOUS at 09:55

## 2023-12-27 ASSESSMENT — ENCOUNTER SYMPTOMS
BLOOD IN STOOL: 0
WHEEZING: 0
DIARRHEA: 0
SHORTNESS OF BREATH: 0
DYSURIA: 0
DIAPHORESIS: 0
VOMITING: 1
CONSTIPATION: 0
NAUSEA: 1
HEADACHES: 0
SINUS PRESSURE: 0
ABDOMINAL PAIN: 1
COUGH: 0
SORE THROAT: 0
PALPITATIONS: 0
CHILLS: 0
FREQUENCY: 0
FEVER: 0

## 2023-12-27 ASSESSMENT — ACTIVITIES OF DAILY LIVING (ADL)
ADLS_ACUITY_SCORE: 35

## 2023-12-27 NOTE — ED TRIAGE NOTES
Pt arrived via triage, ambulatory, in acute pain.  Pt reports waking up at 0530 to severe epigastric pain/buring, that was also cramping.  She had a large watery stool, and has been incontinent of watery stool also.  Pt reports pain was so bad that it made her vomit.       Pt drove herself to hospital.  She has not taken anything for pain, or diarrhea.  Pt is nauseous at this time, and feels again it is due to pain.  Pain is epigastric and burning, cramps come in waves and are 6/10.  Pt is moaning and rolling on cot in pain.  Pt reports no h/o gallbladder issues or pancreatitis.  She has had abd surgery in the past. Pt states she has of course ate and drank over the holidays, but not to excess, and not yesterday.   Pt denies frequency of urination, urgency or discomfort.  UA obtained and sent.  Abd: obese, soft, pain at rest.  Hyperactive BS.    PLAN:  Will start IV and medicate for comfort and await MD assessment and orders.      Triage Assessment (Adult)       Row Name 12/27/23 0988          Triage Assessment    Airway WDL WDL        Respiratory WDL    Respiratory WDL WDL        Skin Circulation/Temperature WDL    Skin Circulation/Temperature WDL WDL        Cardiac WDL    Cardiac WDL WDL        Peripheral/Neurovascular WDL    Peripheral Neurovascular WDL WDL        Cognitive/Neuro/Behavioral WDL    Cognitive/Neuro/Behavioral WDL WDL

## 2023-12-27 NOTE — DISCHARGE INSTRUCTIONS
ICD-10-CM    1. Colitis  K52.9 Primary Care Referral     Enteric Bacteria and Virus Panel by [facility] Stool     C. difficile Toxin B PCR with reflex to C. difficile Antigen and Toxins A/B EIA    obtain stool samples.  return for fever.  stay hyhdrated with 64 oz per day plus replace your losses wityh equilivalent gatorade or pedialyte. . take ibuprofen 600 mg every 6 hours as needed, , tylenol 1000 mg  every 6 hours as needed.  zofran for nause. take oxycodone for breakthrougvh pain

## 2023-12-27 NOTE — Clinical Note
Mary Morrison was seen and treated in our emergency department on 12/27/2023.  She may return to work on 12/31/2023.       If you have any questions or concerns, please don't hesitate to call.      Darin Alston MD

## 2023-12-27 NOTE — ED PROVIDER NOTES
History     Chief Complaint   Patient presents with    Abdominal Pain    Diarrhea     HPI  Mary Morrison is a 33 year old female who presenting with acute severe epigastric pain burning cramping large watery stool output.  Pain resulted in vomiting.  Nausea continues.  Cramping in waves.  No history of prior cholecystitis or pancreatitis.  Prior abdominal surgeries -including a  section .  Some alcohol over the holiday -on Annika Day only.  No other alcohol intake over the weekend.  Does use tobacco.  No drugs.  Notes starting at 530 this morning a sense of stool incontinence and then blow out large volume liquid stool.  No blood in the stool black tarry stools.  She also had several episodes of vomiting nonbilious nonbloody persistent nausea severe epigastric cramping pain at times.  No associated fever cough congestion chest pain shortness of breath.  No significant vaginal discharge.  She has no dysuria urgency frequency.  No hematuria.  She has had 3 prior  sections and an oophorectomy.  She denies current pregnancy.  She also has pain in the right lower quadrant as well as the epigastrium.    Past Surgical History:   Procedure Laterality Date    ARTHROSCOPY OF JOINT UNLISTED       SECTION  2011    Procedure: SECTION;  Section; Surgeon:ALIZA AMADOR; Location:[place] OR     TOOTH EXTRACTION W/FORCEP           Allergies:  Allergies   Allergen Reactions    Ketorolac Tromethamine Other (See Comments)     Comment: vomiting , Description:     Morphine Other (See Comments)     Comment: Confusion, Description:        Problem List:    Patient Active Problem List    Diagnosis Date Noted    Anxiety 2011     Priority: Medium    Psychiatric disorders 2011     Priority: Medium     1. Major depressive disorder, recurrent, severe without psychotic features. 2. Alcohol dependency versus abuse.   AXIS II: Borderline personality disorder.   AXIS III:  Self-inflicted cuts on wrist.   AXIS IV: Long history of mental illness.   AXIS V: Current Global Assessment of Functioning of 50.  Problem list name updated by automated process. Provider to review and confirm      Health Care Home 2011     Priority: Medium     High priority patient - 11    DX V65.8 REPLACED WITH [facility] CARE HOME (2013)      CARDIOVASCULAR SCREENING; LDL GOAL LESS THAN 160 2011     Priority: Medium    Thyroid enlargement 2011     Priority: Medium    Knee pain 2010     Priority: Medium    LGSIL on Pap smear 2010     Priority: Medium     09:Pap--LSIL. Repeat pap PP. (ALAYNA 7/17/10).  8/23/10:pap--LSIL. Recommend colposcopy.  9/21/10:Colpo--benign. Repeat pap 6 months. Reminder placed in EPIC.  11: Pap - NIL. Repeat pap postpartum  11:Pap--NIL. Rpt pap in 1 year with [facility]. In , ep, prob list, hx updated. Reminder sent.    13: Patient failed follow-up. Pap tracking file closed.               Past Medical History:    Past Medical History:   Diagnosis Date    Anxiety     Chickenpox     Papanicolaou smear of cervix with low grade squamous intraepithelial lesion (LGSIL) ,     Urinary tract infections        Past Surgical History:    Past Surgical History:   Procedure Laterality Date    ARTHROSCOPY OF JOINT UNLISTED       SECTION  2011    Procedure: SECTION;  Section; Surgeon:ALIZA AMADOR; Location:[place] OR     TOOTH EXTRACTION W/FORCEP         Family History:    Family History   Problem Relation Age of Onset    C.A.D. Father         stents    Hypertension Father     Cancer Father         testicular cancer    Alcohol/Drug Father         alcohol & drug    Depression Father     Genitourinary Problems Father         kidney stone history    Psychotic Disorder Father     Gynecology Mother         hysterectomy-endometreosis    Hypertension Maternal Grandmother     Breast Cancer Maternal Grandmother      Arthritis Maternal Grandmother     Gynecology Maternal Grandmother         hysterectomy    Hypertension Maternal Grandfather     Arthritis Maternal Grandfather     Arthritis Paternal Grandmother     Osteoporosis Paternal Grandmother     Asthma Paternal Grandfather     Hypertension Paternal Grandfather     Arthritis Paternal Grandfather     Lipids Paternal Grandfather     Obesity Paternal Grandfather     Respiratory Paternal Grandfather         COPD    Cancer - colorectal Paternal Grandfather     Asthma Brother        Social History:  Marital Status:  Single [1]  Social History     Tobacco Use    Smoking status: Every Day     Packs/day: 0.50     Years: 2.00     Additional pack years: 0.00     Total pack years: 1.00     Types: Cigarettes    Smokeless tobacco: Never    Tobacco comments:     10-12 cigarettes a day    Substance Use Topics    Alcohol use: No    Drug use: No        Medications:    cephALEXin (KEFLEX) 500 MG capsule  ondansetron (ZOFRAN ODT) 4 MG ODT tab  oxyCODONE (ROXICODONE) 5 MG tablet  sertraline (ZOLOFT) 100 MG tablet          Review of Systems   Constitutional:  Negative for chills, diaphoresis and fever.   HENT:  Negative for ear pain, sinus pressure and sore throat.    Eyes:  Negative for visual disturbance.   Respiratory:  Negative for cough, shortness of breath and wheezing.    Cardiovascular:  Negative for chest pain and palpitations.   Gastrointestinal:  Positive for abdominal pain, nausea and vomiting. Negative for blood in stool, constipation and diarrhea.   Genitourinary:  Negative for dysuria, frequency and urgency.   Skin:  Negative for rash.   Neurological:  Negative for headaches.   All other systems reviewed and are negative.      Physical Exam   BP: (!) 114/94  Pulse: 87  Temp: 98.2  F (36.8  C)  Resp: 18  Weight: 88.5 kg (195 lb)      Physical Exam  Constitutional:       General: She is in acute distress.      Appearance: She is not diaphoretic.   Eyes:      Conjunctiva/sclera:  Conjunctivae normal.   Cardiovascular:      Rate and Rhythm: Normal rate and regular rhythm.      Heart sounds: No murmur heard.  Pulmonary:      Effort: No respiratory distress.      Breath sounds: No stridor. No wheezing or rhonchi.   Abdominal:      General: Abdomen is flat. There is no distension.      Palpations: Abdomen is soft. There is no mass.      Tenderness: There is abdominal tenderness. There is no guarding.   Musculoskeletal:      Cervical back: Neck supple.      Right lower leg: No edema.      Left lower leg: No edema.   Skin:     Coloration: Skin is not pale.      Findings: No rash.   Neurological:      Mental Status: She is alert.      Motor: No weakness.         ED Course                 Procedures              Critical Care time:  none               Results for orders placed or performed during the hospital encounter of 12/27/23 (from the past 24 hour(s))   UA with Microscopic reflex to Culture    Specimen: Urine, Midstream   Result Value Ref Range    Color Urine Yellow Colorless, Straw, Light Yellow, Yellow    Appearance Urine Slightly Cloudy (A) Clear    Glucose Urine Negative Negative mg/dL    Bilirubin Urine Negative Negative    Ketones Urine 5 (A) Negative mg/dL    Specific Gravity Urine 1.029 1.003 - 1.035    Blood Urine Negative Negative    pH Urine 5.0 5.0 - 7.0    Protein Albumin Urine 30 (A) Negative mg/dL    Urobilinogen Urine 2.0 Normal, 2.0 mg/dL    Nitrite Urine Negative Negative    Leukocyte Esterase Urine Negative Negative    Bacteria Urine Few (A) None Seen /HPF    Mucus Urine Present (A) None Seen /LPF    RBC Urine 3 (H) <=2 /HPF    WBC Urine 3 <=5 /HPF    Squamous Epithelials Urine 9 (H) <=1 /HPF    Hyaline Casts Urine 3 (H) <=2 /LPF    Narrative    Urine Culture not indicated   CBC with platelets, differential    Narrative    The following orders were created for panel order CBC with platelets, differential.  Procedure                               Abnormality         Status                      ---------                               -----------         ------                     CBC with platelets and d...[[id number]]  Abnormal            Final result                 Please view results for these tests on the individual orders.   Comprehensive metabolic panel   Result Value Ref Range    Sodium 138 135 - 145 mmol/L    Potassium 4.6 3.4 - 5.3 mmol/L    Carbon Dioxide (CO2) 21 (L) 22 - 29 mmol/L    Anion Gap 14 7 - 15 mmol/L    Urea Nitrogen 4.8 (L) 6.0 - 20.0 mg/dL    Creatinine 0.72 0.51 - 0.95 mg/dL    GFR Estimate >90 >60 mL/min/1.73m2    Calcium 9.4 8.6 - 10.0 mg/dL    Chloride 103 98 - 107 mmol/L    Glucose 100 (H) 70 - 99 mg/dL    Alkaline Phosphatase 121 40 - 150 U/L    AST 18 0 - 45 U/L    ALT 20 0 - 50 U/L    Protein Total 8.2 6.4 - 8.3 g/dL    Albumin 4.3 3.5 - 5.2 g/dL    Bilirubin Total 0.3 <=1.2 mg/dL   [place] Draw    Narrative    The following orders were created for panel order [place] Draw.  Procedure                               Abnormality         Status                     ---------                               -----------         ------                     Extra Blue Top Tube[[id number]]                              Final result               Extra Red Top Tube[[id number]]                                                            Please view results for these tests on the individual orders.   Lipase   Result Value Ref Range    Lipase 13 13 - 60 U/L   HCG qualitative pregnancy (blood)   Result Value Ref Range    hCG Serum Qualitative Negative Negative   CBC with platelets and differential   Result Value Ref Range    WBC Count 11.5 (H) 4.0 - 11.0 10e3/uL    RBC Count 4.77 3.80 - 5.20 10e6/uL    Hemoglobin 15.4 11.7 - 15.7 g/dL    Hematocrit 46.4 35.0 - 47.0 %    MCV 97 78 - 100 fL    MCH 32.3 26.5 - 33.0 pg    MCHC 33.2 31.5 - 36.5 g/dL    RDW 14.1 10.0 - 15.0 %    Platelet Count 378 150 - 450 10e3/uL    % Neutrophils 88 %    % Lymphocytes 8 %    % Monocytes 3 %    %  Eosinophils 1 %    % Basophils 0 %    % Immature Granulocytes 0 %    NRBCs per 100 WBC 0 <1 /100    Absolute Neutrophils 10.1 (H) 1.6 - 8.3 10e3/uL    Absolute Lymphocytes 0.9 0.8 - 5.3 10e3/uL    Absolute Monocytes 0.4 0.0 - 1.3 10e3/uL    Absolute Eosinophils 0.1 0.0 - 0.7 10e3/uL    Absolute Basophils 0.0 0.0 - 0.2 10e3/uL    Absolute Immature Granulocytes 0.0 <=0.4 10e3/uL    Absolute NRBCs 0.0 10e3/uL   Extra Blue Top Tube   Result Value Ref Range    Hold Specimen [facility]    CT Abdomen Pelvis w Contrast    Narrative    CT ABDOMEN AND PELVIS WITH CONTRAST 12/27/2023 11:32 AM    CLINICAL HISTORY: Abdominal pain - acute severe RLQ and epigastric.    TECHNIQUE: CT scan of the abdomen and pelvis was performed following  injection of IV contrast. Multiplanar reformats were obtained. Dose  reduction techniques were used.    CONTRAST: 95 mL Isovue-370    COMPARISON: CT 11/22/2023.    FINDINGS:   LOWER CHEST: Normal.    HEPATOBILIARY: No acute abnormality. Suggestion of areas of fatty  infiltration at the left liver anteriorly and posteriorly both seen on  series 5 image 53. Gallbladder unremarkable.    PANCREAS: Normal.    SPLEEN: Normal.    ADRENAL GLANDS: Normal.    KIDNEYS/BLADDER: No stones or hydronephrosis. No acute renal  abnormality.    BOWEL: No evidence for appendicitis. There is no bowel obstruction.  Some wall thickening suggested diffusely involving the colon. No  abscess or free air.    PELVIC ORGANS: Trace pelvic fluid.    ADDITIONAL FINDINGS: None.    MUSCULOSKELETAL: Normal.      Impression    IMPRESSION:   Suggestion of a diffuse colitis. No abscess or free air.    JUAN MANUEL HIRSCH MD         SYSTEM ID:  [id number]       Medications   ondansetron (ZOFRAN) injection 4 mg (4 mg Intravenous $Given 12/27/23 09)   sodium chloride 0.9% BOLUS 1,000 mL (0 mLs Intravenous Stopped 12/27/23 5092)   pantoprazole (PROTONIX) IV push injection 40 mg (40 mg Intravenous $Given 12/27/23 0944)   iopamidol (ISOVUE-370) solution  95 mL (95 mLs Intravenous $Given 23 1124)   sodium chloride 0.9 % bag 500mL for CT scan flush use (64 mLs As instructed $Given 23 1124)   ketorolac (TORADOL) injection 15 mg (15 mg Intravenous $Given 23 1333)       Assessments & Plan (with Medical Decision Making)     MDM: Mary Morrison is a 33 year old female presenting with severe epigastric and right lower quadrant abdominal pain prior  sections and oophorectomy.  Here with multiple episodes of liquid diarrhea and nausea vomiting.  No obvious spoiled food intake.  Although did have quality food.  Some alcohol over the weekend.  Has parent idiosyncratic reaction to Toradol and will hold on this for now we will give fluids dose of Dilaudid Zofran.  Reevaluate with CT imaging given the multiple regions of pain.  Right upper quadrant appears to be nontender.  Check lipase for pancreatitis.  Suspect this may end up being gastroenteritis with significant abdominal cramping.    Her CT findings do demonstrate colitis and I would recommend that she obtain stool samples as soon as possible for C. difficile and for enteric bacteria.  She was on Keflex several weeks to a month ago for possible UTI.  I given precautions for return.  We discussed the brief use of oxycodone.  Would recommend Tylenol and Motrin as needed for pain.  Oxycodone only for breakthrough and noted this may worsen her symptoms if she becomes constipated with this.      PDMP Review         Value Time User    State PDMP site checked  Yes 2023  1:14 PM Darin Alston MD              I have reviewed the nursing notes.    I have reviewed the findings, diagnosis, plan and need for follow up with the patient.           Medical Decision Making  The patient's presentation was of moderate complexity (an undiagnosed new problem with uncertain diagnosis).    The patient's evaluation involved:  ordering and/or review of 3+ test(s) in this encounter (see separate area of note for  details)    The patient's management necessitated high risk (a parenteral controlled substance).        Discharge Medication List as of 12/27/2023  1:38 PM        START taking these medications    Details   !! ondansetron (ZOFRAN ODT) 4 MG ODT tab Take 1 tablet (4 mg) by mouth every 8 hours as needed for nausea, Disp-10 tablet, R-0, E-Prescribe      !! oxyCODONE (ROXICODONE) 5 MG tablet Take 1 tablet (5 mg) by mouth every 6 hours as needed, Disp-6 tablet, R-0, E-Prescribe       !! - Potential duplicate medications found. Please discuss with provider.          Final diagnoses:   Colitis - obtain stool samples.  return for fever.  stay hyhdrated with 64 oz per day plus replace your losses wityh equilivalent gatorade or pedialyte. . take ibuprofen 600 mg every 6 hours as needed, , tylenol 1000 mg  every 6 hours as needed.  zofran for nause. take oxycodone for breakthrougvh pain       12/27/2023   [facility] EMERGENCY DEPT       Darin Alston MD  12/27/23 1931

## 2023-12-28 ENCOUNTER — PATIENT OUTREACH (OUTPATIENT)
Dept: CARE COORDINATION | Facility: CLINIC | Age: 33
End: 2023-12-28

## 2023-12-28 ENCOUNTER — LAB (OUTPATIENT)
Dept: LAB | Facility: CLINIC | Age: 33
End: 2023-12-28
Payer: COMMERCIAL

## 2023-12-28 DIAGNOSIS — K52.9 COLITIS: ICD-10-CM

## 2023-12-28 LAB — C DIFF TOX B STL QL: NEGATIVE

## 2023-12-28 PROCEDURE — 87507 IADNA-DNA/RNA PROBE TQ 12-25: CPT

## 2023-12-28 PROCEDURE — 87493 C DIFF AMPLIFIED PROBE: CPT | Mod: 59

## 2023-12-28 NOTE — PROGRESS NOTES
Clinic Care Coordination Contact  Community Health Worker Initial Outreach    CHW Initial Information Gathering:  Referral Source: ED Follow-Up  Preferred Hospital: [place], [place]  [phone]  Preferred Urgent Care: [facility], [phone]  No PCP office visit in Past Year: Yes       Patient accepts CC: No, due to the patient stating that at this time there are no concerns or questions for CCC to assist with. Patient will be sent Care Coordination introduction letter for future reference.     HUGH Jiang  [phone]  Connected Care Resource Center  [facility]

## 2023-12-28 NOTE — LETTER
[facility] CARE COORDINATION  [address] [facility] [address]    December 28, 2023    Mary Morrison  [address]  [place] [address]      Dear Mary,    I am a clinic community health worker who works with [facility] with the [facility]. I wanted to thank you for spending the time to talk with me.  Below is a description of clinic care coordination and how we can further assist you.       The clinic care coordination team is made up of a registered nurse, , financial resource worker and community health worker who understand the health care system. The goal of clinic care coordination is to help you manage your health and improve access to the health care system. Our team works alongside your provider to assist you in determining your health and social needs. We can help you obtain health care and community resources, providing you with necessary information and education. We can work with you through any barriers and develop a care plan that helps coordinate and strengthen the communication between you and your care team.  Our services are voluntary and are offered without charge to you personally.    If you wish to connect with the Clinic Care Coordination Team, please let your [facility] Primary Care Provider or Clinic Care Team know and they can place a referral. The Clinic Care Coordination team will then reach out by phone to further support you.    We are focused on providing you with the highest-quality healthcare experience possible.    Sincerely,   Your care team with [facility]

## 2023-12-29 ENCOUNTER — TELEPHONE (OUTPATIENT)
Dept: EMERGENCY MEDICINE | Facility: CLINIC | Age: 33
End: 2023-12-29
Payer: COMMERCIAL

## 2023-12-29 NOTE — TELEPHONE ENCOUNTER
"[facility] ([place]) Emergency Department/Urgent Care Lab result notification  [Note:  ED Lab Results RN will reference the [facility] Emergency Dept visit note prior to contacting patient AND/OR prior to consulting Emergency Dept Provider.  Highlights of Emergency Dept visit in information summary at the bottom of this telephone note]    1. Reason for call  Notify of lab results  Assess patient symptoms [if necessary]  Review ED Providers recommendations/discharge instructions (if necessary)  Advise per [facility] ED lab result protocol    2. Lab Result (including Rx patient on, if applicable).  If culture, copy of lab report at bottom.  Final Enteric Bacteria and Virus Panel by PAMELLA Stool is POSITIVE for Campylobacter Species and Norovirus GI/GII  Recommendations in treatment per [facility] ED Lab Result Enteric Bacteria and Virus Panel protocol.    3. RN Assessment (Patient's current Symptoms):  Time of call: 12/29/2023 5:16 PM  Assessment: patient is overall improved, follow up appointment 1/9/23  Abdominal pain: Yes - 'stomach is still really sore from cramping\" - cramping has improved though \"no cramping today, feels like I worked out a lot though\" - 'its not pain, its not worse, its only gotten better\" - declined to rate, she was able to tolerate eating food here  Nausea/vomiting:  Nausea has been persistent, no vomiting  Fever: no  Diarrhea: 1 time this AM and nothing since -   Hydration:  doing electrolyte replacement, drinking fluids, urine is yellow/medium, last urinated a few hours ago  Breastfeeding:  No  Antidiarrhea medication:  No  Immunocompromised status:  No    4. RN Recommendations/Instructions per [facility] ED lab result protocol  [facility] ED lab result protocol used: Enteric bacteria & virus panel  Patient was notified of lab result and treatment recommendations  RN reviewed information about Campylobacter/norovirus organism, enteric bacteria approach as self " limiting illness than needs to run it course and antibiotics typically not required, promoting, rest, fluids, electrolytes, dietary choices, washing hands, bleach based disinfectant, stay home for fever/diarrhea, avoid food prep, avoid antidiarrhea medication, follow up for recheck if symptoms seem unchanged or persisting over the next 2-3 days, return at anytime for worsening fevers, vomiting, diarrhea, abdominal pain, dehydration, weak/pale/faint - patient verbalized understanding and agrees with plan.      5. Please Contact your PCP clinic or return to the Emergency department if your:  Symptoms return.  Symptoms do not improve after 3 days on antibiotic.  Symptoms do not resolve after completing antibiotic.  Symptoms worsen or other concerning symptoms.    Information summary from Emergency Dept/Urgent Care visit on 12/28/23  Significant Medical hx, if applicable (i.e. CKD, diabetes) Reviewed   Allergies Allergies   Allergen Reactions    Ketorolac Tromethamine Other (See Comments)     Comment: vomiting , Description:     Morphine Other (See Comments)     Comment: Confusion, Description:       Weight, if applicable Wt Readings from Last 2 Encounters:   12/27/23 88.5 kg (195 lb)   11/22/23 89.3 kg (196 lb 12.8 oz)      Coumadin/Warfarin [Yes /No] NO   Creatinine Level (mg/dl) Creatinine   Date Value Ref Range Status   12/27/2023 0.72 0.51 - 0.95 mg/dL Final   01/14/2011 0.57 0.52 - 1.04 mg/dL Final     Comment:     New IDMS-traceable calibration  beginning 5/1/08      Creatinine clearance (ml/min), if applicable Serum creatinine: 0.72 mg/dL 12/27/23 1009  Estimated creatinine clearance: 107.9 mL/min   Pregnant (Yes/No/NA) Negative HCG on 12/27/23   Breastfeeding (Yes/No/NA) Unknown          Sherine Coley RN  [facility] [place]  Emergency Dept Lab Result RN  Ph# [phone]

## 2024-01-02 LAB
ADV 40+41 DNA STL QL NAA+NON-PROBE: NEGATIVE
ASTRO TYP 1-8 RNA STL QL NAA+NON-PROBE: NEGATIVE
C CAYETANENSIS DNA STL QL NAA+NON-PROBE: NEGATIVE
CAMPYLOBACTER DNA SPEC NAA+PROBE: POSITIVE
CRYPTOSP DNA STL QL NAA+NON-PROBE: NEGATIVE
E COLI O157 DNA STL QL NAA+NON-PROBE: ABNORMAL
E HISTOLYT DNA STL QL NAA+NON-PROBE: NEGATIVE
EAEC ASTA GENE ISLT QL NAA+PROBE: NEGATIVE
EC STX1+STX2 GENES STL QL NAA+NON-PROBE: NEGATIVE
EPEC EAE GENE STL QL NAA+NON-PROBE: NEGATIVE
ETEC LTA+ST1A+ST1B TOX ST NAA+NON-PROBE: NEGATIVE
G LAMBLIA DNA STL QL NAA+NON-PROBE: NEGATIVE
NOROVIRUS GI+II RNA STL QL NAA+NON-PROBE: POSITIVE
P SHIGELLOIDES DNA STL QL NAA+NON-PROBE: NEGATIVE
RVA RNA STL QL NAA+NON-PROBE: NEGATIVE
SALMONELLA SP RPOD STL QL NAA+PROBE: NEGATIVE
SAPO I+II+IV+V RNA STL QL NAA+NON-PROBE: NEGATIVE
SHIGELLA SP+EIEC IPAH ST NAA+NON-PROBE: NEGATIVE
V CHOLERAE DNA SPEC QL NAA+PROBE: NEGATIVE
VIBRIO DNA SPEC NAA+PROBE: NEGATIVE
Y ENTEROCOL DNA STL QL NAA+PROBE: NEGATIVE

## 2024-01-09 ENCOUNTER — OFFICE VISIT (OUTPATIENT)
Dept: FAMILY MEDICINE | Facility: CLINIC | Age: 34
End: 2024-01-09
Payer: COMMERCIAL

## 2024-01-09 VITALS
WEIGHT: 190.6 LBS | RESPIRATION RATE: 16 BRPM | DIASTOLIC BLOOD PRESSURE: 72 MMHG | OXYGEN SATURATION: 98 % | SYSTOLIC BLOOD PRESSURE: 102 MMHG | HEIGHT: 61 IN | HEART RATE: 80 BPM | BODY MASS INDEX: 35.98 KG/M2 | TEMPERATURE: 96.8 F

## 2024-01-09 DIAGNOSIS — K52.9 COLITIS: Primary | ICD-10-CM

## 2024-01-09 DIAGNOSIS — N39.3 FEMALE STRESS INCONTINENCE: ICD-10-CM

## 2024-01-09 DIAGNOSIS — Z13.1 SCREENING FOR DIABETES MELLITUS: ICD-10-CM

## 2024-01-09 DIAGNOSIS — R53.83 OTHER FATIGUE: ICD-10-CM

## 2024-01-09 LAB
ALBUMIN UR-MCNC: NEGATIVE MG/DL
APPEARANCE UR: CLEAR
BILIRUB UR QL STRIP: NEGATIVE
COLOR UR AUTO: YELLOW
ERYTHROCYTE [DISTWIDTH] IN BLOOD BY AUTOMATED COUNT: 14.2 % (ref 10–15)
FERRITIN SERPL-MCNC: 46 NG/ML (ref 6–175)
GLUCOSE UR STRIP-MCNC: NEGATIVE MG/DL
HBA1C MFR BLD: 5.9 % (ref 0–5.6)
HCT VFR BLD AUTO: 43 % (ref 35–47)
HGB BLD-MCNC: 13.5 G/DL (ref 11.7–15.7)
HGB UR QL STRIP: NEGATIVE
IRON BINDING CAPACITY (ROCHE): 351 UG/DL (ref 240–430)
IRON SATN MFR SERPL: 13 % (ref 15–46)
IRON SERPL-MCNC: 46 UG/DL (ref 37–145)
KETONES UR STRIP-MCNC: NEGATIVE MG/DL
LEUKOCYTE ESTERASE UR QL STRIP: NEGATIVE
MCH RBC QN AUTO: 30.8 PG (ref 26.5–33)
MCHC RBC AUTO-ENTMCNC: 31.4 G/DL (ref 31.5–36.5)
MCV RBC AUTO: 98 FL (ref 78–100)
NITRATE UR QL: NEGATIVE
PH UR STRIP: 6.5 [PH] (ref 5–7)
PLATELET # BLD AUTO: 368 10E3/UL (ref 150–450)
RBC # BLD AUTO: 4.39 10E6/UL (ref 3.8–5.2)
SP GR UR STRIP: <=1.005 (ref 1–1.03)
TSH SERPL DL<=0.005 MIU/L-ACNC: 3.14 UIU/ML (ref 0.3–4.2)
UROBILINOGEN UR STRIP-ACNC: 0.2 E.U./DL
WBC # BLD AUTO: 11.1 10E3/UL (ref 4–11)

## 2024-01-09 PROCEDURE — 82728 ASSAY OF FERRITIN: CPT | Performed by: FAMILY MEDICINE

## 2024-01-09 PROCEDURE — 83540 ASSAY OF IRON: CPT | Performed by: FAMILY MEDICINE

## 2024-01-09 PROCEDURE — 36415 COLL VENOUS BLD VENIPUNCTURE: CPT | Performed by: FAMILY MEDICINE

## 2024-01-09 PROCEDURE — 82306 VITAMIN D 25 HYDROXY: CPT | Performed by: FAMILY MEDICINE

## 2024-01-09 PROCEDURE — 99204 OFFICE O/P NEW MOD 45 MIN: CPT | Performed by: FAMILY MEDICINE

## 2024-01-09 PROCEDURE — 83550 IRON BINDING TEST: CPT | Performed by: FAMILY MEDICINE

## 2024-01-09 PROCEDURE — 81003 URINALYSIS AUTO W/O SCOPE: CPT | Performed by: FAMILY MEDICINE

## 2024-01-09 PROCEDURE — 85027 COMPLETE CBC AUTOMATED: CPT | Performed by: FAMILY MEDICINE

## 2024-01-09 PROCEDURE — 83036 HEMOGLOBIN GLYCOSYLATED A1C: CPT | Performed by: FAMILY MEDICINE

## 2024-01-09 PROCEDURE — 84443 ASSAY THYROID STIM HORMONE: CPT | Performed by: FAMILY MEDICINE

## 2024-01-09 RX ORDER — OXYBUTYNIN CHLORIDE 5 MG/1
5 TABLET ORAL 3 TIMES DAILY
Qty: 90 TABLET | Refills: 1 | Status: SHIPPED | OUTPATIENT
Start: 2024-01-09 | End: 2024-01-19

## 2024-01-09 ASSESSMENT — PATIENT HEALTH QUESTIONNAIRE - PHQ9
SUM OF ALL RESPONSES TO PHQ QUESTIONS 1-9: 12
SUM OF ALL RESPONSES TO PHQ QUESTIONS 1-9: 12
10. IF YOU CHECKED OFF ANY PROBLEMS, HOW DIFFICULT HAVE THESE PROBLEMS MADE IT FOR YOU TO DO YOUR WORK, TAKE CARE OF THINGS AT HOME, OR GET ALONG WITH OTHER PEOPLE: VERY DIFFICULT

## 2024-01-09 ASSESSMENT — PAIN SCALES - GENERAL: PAINLEVEL: NO PAIN (0)

## 2024-01-09 NOTE — PATIENT INSTRUCTIONS
Check urine studies today.   Check lab work today.     Start on oxybutynin 5 mg three times daily.   Follow up with Urology     Fatigue   Lab workup today.

## 2024-01-09 NOTE — PROGRESS NOTES
Assessment & Plan     Colitis  -- positive campylobacter and norovirus. Symptoms have been improving and likely has run its course. No blood in the stool. No fevers. Bowels back to baseline.     Screening for diabetes mellitus  - Hemoglobin A1c    Other fatigue  Reports ongoing fatigue and lack of desire to do things. Check lab work as below. Recent ED labs reviewed with no specific cause. Discussed depression as potentially culprit and patient does not feel depressed. Has tried sertraline in the past without improvement of symptoms.   - CBC with platelets  - TSH with free T4 reflex  - Iron and iron binding capacity  - Ferritin  - Vitamin D Deficiency  - Hemoglobin A1c    Female stress incontinence  -- ongoing issues. 6 births over the years.   -- having issues with urine leakage with coughing and sneezing. Wearing a pad daily.   -- check UA, Proceed with oxybutynin 5 mg TID. Follow up with Urology.   - UA Macroscopic with reflex to Microscopic and Culture - Lab Collect  - oxyBUTYnin (DITROPAN) 5 MG tablet  Dispense: 90 tablet; Refill: 1  - Adult Urology  Referral         MED REC REQUIRED  Post Medication Reconciliation Status: discharge medications reconciled and changed, per note/orders    The risks, benefits and treatment options of prescribed medications or other treatments have been discussed with the patient. The patient verbalized their understanding and should call or follow up if no improvement or if they develop further problems.      [place],   [facility] is a 33 year old, presenting for the following health issues:  Hospital F/U (ER F/U 12/27/23 @ [facility]) and Establish Care (New to the area)        1/9/2024     3:23 PM   Additional Questions   Roomed by Ruma WHYTE   Accompanied by Self       HPI       ED/UC Followup:    Facility:  [facility]  Date of visit: 12/27/23  Reason for visit: vomiting and diarrhea  Current Status: symptoms  "resolved      Camplyobacter and norovirus positive on stool studies.     Symptoms have resolved since that time.   Bowels back to baseline.   No blood in the stool.   No abdominal pain.   Continues to have right flank pain, which has been persistent for quite some time. CT unremarkable.       Issues with urine leakage  History of multiple kids, 6 children. 3 cesareans, 3 vaginal.   Will have some incontinence with coughing or sneezing.   No burning with urination.   Reports having some tingling at times.   Having some achiness/soreness issues in the right flank. Recent CT unremarkable with no stones, hydronephrosis.      Fatigue  Reports decrease ambition to do things. ( Shower, brush teeth)   Does not feel depressed.   In the past was on zoloft which has not produced much benefit.     Review of Systems   Constitutional, HEENT, cardiovascular, pulmonary, gi and gu systems are negative, except as otherwise noted.      Objective    /72 (BP Location: Right arm, Patient Position: Sitting, Cuff Size: Adult Regular)   Pulse 80   Temp 96.8  F (36  C) (Tympanic)   Resp 16   Ht 1.549 m (5' 1\")   Wt 86.5 kg (190 lb 9.6 oz)   SpO2 98%   BMI 36.01 kg/m    Body mass index is 36.01 kg/m .  Physical Exam   General: alert, cooperative, no acute distress   CV: RRR, no murmur  Resp: non-labored breathing, clear to auscultation, no wheezing or rales   Abdomen: Soft, mild tenderness RLQ. No rebound or guarding.   Extremities: No peripheral edema, calves non-tender.           Answers submitted by the patient for this visit:  Patient Health Questionnaire (Submitted on 1/9/2024)  If you checked off any problems, how difficult have these problems made it for you to do your work, take care of things at home, or get along with other people?: Very difficult  PHQ9 TOTAL SCORE: 12    "

## 2024-01-10 DIAGNOSIS — E61.1 LOW IRON: Primary | ICD-10-CM

## 2024-01-10 DIAGNOSIS — E55.9 VITAMIN D DEFICIENCY: ICD-10-CM

## 2024-01-10 LAB — VIT D+METAB SERPL-MCNC: 14 NG/ML (ref 20–50)

## 2024-01-10 RX ORDER — VITAMIN B COMPLEX
1 TABLET ORAL DAILY
Qty: 90 TABLET | Refills: 0 | Status: SHIPPED | OUTPATIENT
Start: 2024-01-10

## 2024-01-10 RX ORDER — FERROUS SULFATE 325(65) MG
325 TABLET, DELAYED RELEASE (ENTERIC COATED) ORAL EVERY OTHER DAY
Qty: 45 TABLET | Refills: 0 | Status: SHIPPED | OUTPATIENT
Start: 2024-01-10

## 2024-01-19 ENCOUNTER — OFFICE VISIT (OUTPATIENT)
Dept: UROLOGY | Facility: CLINIC | Age: 34
End: 2024-01-19
Attending: FAMILY MEDICINE
Payer: COMMERCIAL

## 2024-01-19 VITALS
HEIGHT: 61 IN | HEART RATE: 90 BPM | OXYGEN SATURATION: 98 % | TEMPERATURE: 97.2 F | DIASTOLIC BLOOD PRESSURE: 76 MMHG | WEIGHT: 190 LBS | SYSTOLIC BLOOD PRESSURE: 107 MMHG | BODY MASS INDEX: 35.87 KG/M2

## 2024-01-19 DIAGNOSIS — N39.46 MIXED STRESS AND URGE URINARY INCONTINENCE: Primary | ICD-10-CM

## 2024-01-19 PROCEDURE — 99203 OFFICE O/P NEW LOW 30 MIN: CPT | Performed by: STUDENT IN AN ORGANIZED HEALTH CARE EDUCATION/TRAINING PROGRAM

## 2024-01-19 RX ORDER — SOLIFENACIN SUCCINATE 5 MG/1
5 TABLET, FILM COATED ORAL DAILY
Qty: 90 TABLET | Refills: 1 | Status: SHIPPED | OUTPATIENT
Start: 2024-01-19

## 2024-01-19 ASSESSMENT — PAIN SCALES - GENERAL: PAINLEVEL: NO PAIN (0)

## 2024-01-19 NOTE — NURSING NOTE
"Initial /76   Pulse 90   Temp 97.2  F (36.2  C) (Tympanic)   Ht 1.549 m (5' 1\")   Wt 86.2 kg (190 lb)   SpO2 98%   BMI 35.90 kg/m   Estimated body mass index is 35.9 kg/m  as calculated from the following:    Height as of this encounter: 1.549 m (5' 1\").    Weight as of this encounter: 86.2 kg (190 lb). .  Stacia KHAN CMA 01/19/24 8:35 AM  "

## 2024-01-19 NOTE — PROGRESS NOTES
Chief Complaint:   Urinary incontinence         History of Present Illness:   Mary Morrison is a 33 year old female with a history of vitamin D deficiency who presents for evaluation of urinary incontinence.     The patient reports a several year history of incontinence with coughing, primarily. She also notes incontinence with intercourse. She is wearing one pad per day. She notes some urge incontinence. She reports nocturia x 0-1. She is wearing a pad overnight. She denies dysuria and gross hematuria.     She recently started oxybutynin IR 5 mg about two weeks ago. She has only been taking it once per day. She is experiencing dry mouth. She has not noticed much of a difference so far.     She reports diarrhea.     She denies a history of urologic procedures. She has a history of three vaginal deliveries and three c-sections.     She drinks sweet tea, water, and coffee.          Past Medical History:     Past Medical History:   Diagnosis Date    Anxiety     Chickenpox     Papanicolaou smear of cervix with low grade squamous intraepithelial lesion (LGSIL) ,     [place] () Benign    Urinary tract infections             Past Surgical History:     Past Surgical History:   Procedure Laterality Date    ARTHROSCOPY OF JOINT UNLISTED       SECTION  2011    Procedure: SECTION;  Section; Surgeon:ALIZA AMADOR; Location:[place] OR     TOOTH EXTRACTION W/FORCEP              Medications     Current Outpatient Medications   Medication    B Complex-Biotin-FA (B COMPLETE PO)    ferrous sulfate (FE TABS) 325 (65 Fe) MG EC tablet    oxyBUTYnin (DITROPAN) 5 MG tablet    UNABLE TO FIND    Vitamin D3 (CHOLECALCIFEROL) 25 mcg (1000 units) tablet     No current facility-administered medications for this visit.            Allergies:   Ketorolac tromethamine and Morphine         Review of Systems:  From intake questionnaire   Negative 14 system review except as noted on HPI, nurse's  "note.         Physical Exam:   Patient is a 33 year old  female   Vitals: Blood pressure 107/76, pulse 90, temperature 97.2  F (36.2  C), temperature source Tympanic, height 1.549 m (5' 1\"), weight 86.2 kg (190 lb), SpO2 98%.  General Appearance Adult: Alert, no acute distress, oriented.  Lungs: Non-labored breathing.  Heart: No obvious jugular venous distension present.  Neuro: Alert, oriented, speech and mentation normal      Labs and Pathology:    I personally reviewed all applicable laboratory data and went over findings with patient  Significant for:    CBC RESULTS:  Recent Labs   Lab Test 01/09/24  1602 12/27/23  1009   WBC 11.1* 11.5*   HGB 13.5 15.4    378        BMP RESULTS:  Recent Labs   Lab Test 12/27/23  1009 11/22/23  1947    138   POTASSIUM 4.6 3.6   CHLORIDE 103 102   CO2 21* 21*   ANIONGAP 14 15   * 92   BUN 4.8* 5.3*   CR 0.72 0.78   GFRESTIMATED >90 >90   CINDY 9.4 9.2       UA RESULTS:   Recent Labs   Lab Test 01/09/24  1602 12/27/23  0834 11/22/23  2051   SG <=1.005 1.029 1.020   URINEPH 6.5 5.0 6.0   NITRITE Negative Negative Negative   RBCU  --  3* 2   WBCU  --  3 1            Assessment and Plan:     Assessment: 33 year old female seen in evaluation for mixed urinary incontinence, primarily stress. She recently started oxybutynin IR 5 mg about two weeks ago. She has only been taking it once per day. She is experiencing dry mouth. She has not noticed much of a difference so far.     We discussed the etiologies of stress and urge incontinence and how they differ. We discussed that the options of treating stress incontinence to include observation, pelvic floor physical therapy, and surgical correction most commonly with midurethral sling or autologous rectus fascial sling. We then discussed that initial urge incontinence treatments include observation,, medications most commonly anticholinergics, and physical therapy. The patient elected to try pelvic floor physical therapy. " She would like to continue with a medication for urge incontinence for the time being. We will switch to solifenacin for lower rates of dry mouth and once per day dosing.     Plan:  Stop oxybutynin.   Start solifenacin 5 mg daily.   Pelvic floor PT referral for mixed incontinence.   Follow up in 3-6 months, sooner if concerns.     LIZA BHATIA PA-C  Department of Urology

## 2024-03-13 ENCOUNTER — THERAPY VISIT (OUTPATIENT)
Dept: PHYSICAL THERAPY | Facility: CLINIC | Age: 34
End: 2024-03-13
Attending: STUDENT IN AN ORGANIZED HEALTH CARE EDUCATION/TRAINING PROGRAM
Payer: COMMERCIAL

## 2024-03-13 DIAGNOSIS — N39.46 MIXED STRESS AND URGE URINARY INCONTINENCE: ICD-10-CM

## 2024-03-13 PROCEDURE — 97162 PT EVAL MOD COMPLEX 30 MIN: CPT | Mod: GP | Performed by: PHYSICAL THERAPIST

## 2024-03-13 PROCEDURE — 97535 SELF CARE MNGMENT TRAINING: CPT | Mod: GP | Performed by: PHYSICAL THERAPIST

## 2024-03-13 PROCEDURE — 97110 THERAPEUTIC EXERCISES: CPT | Mod: GP | Performed by: PHYSICAL THERAPIST

## 2024-03-13 NOTE — PROGRESS NOTES
"PHYSICAL THERAPY EVALUATION  Type of Visit: Evaluation    See electronic medical record for Abuse and Falls Screening details.    Subjective     Pt was in Urology with her father in law (for his appt), and mentioned her own issues. Saw her PCP and was referred to Urology and then on to PT for PFM dysfunction. States was given Vesicare and this helped with the urgency, but makes her \"feel weird.\" Primary c/o is leaking urine with stressors and that she doesn't notice she has to pee and then has a very emergent urge.   Presenting condition or subjective complaint: Urinary Incontinence  Date of onset: 01/19/24    Relevant medical history: Bladder or bowel problems; Incontinence; Overweight; Smoking; Thyroid problems   Dates & types of surgery: C-sections: 2011, 2016, 2018; Oophorectomy (R) 2014    Prior diagnostic imaging/testing results:       Prior therapy history for the same diagnosis, illness or injury: No      Prior Level of Function  Transfers: Independent  Ambulation: Independent  ADL: Independent    Living Environment  Social support: With a significant other or spouse   Type of home: House; 2-story   Stairs to enter the home: No   Is there a railing: No   Ramp: No   Stairs inside the home: Yes 15 Is there a railing: No   Help at home: None  Equipment owned:       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  Hobbies/Interests: \"I have 6 children - sleep/relax.\"    Patient goals for therapy: 1/2019 at birth of last child    Pain assessment: Pain denied     Objective      PELVIC EVALUATION  ADDITIONAL HISTORY:  Sex assigned at birth:  Female  Gender identity:    Female  Pronouns:    She/Her/Hers    Bladder History:  Feels bladder filling:  Yes  Triggers for feeling of inability to wait to go to the bathroom:    No  How long can you wait to urinate:  1 hour, but better not cough  Gets up at night to urinate:    2-3x  Can stop the flow of urine when urinating:  Yes  Volume of urine usually released:   Varies  Other issues:  " "Trouble emptying bladder completely, Dribbling after urinating  Number of bladder infections in last 12 months:    Fluid intake per day:      Water = 10-20 oz/day, Caffeine: Coffee = 8 oz/day, Alcohol: varies, Sweet Tea: \"a lot\"  Medications taken for bladder:     Vesicare  Activities causing urine leak:    Cough, sneeze, jump, run, hurrying to bathroom due to a strong urge to urinate (pee)  Amount of urine typically leaked:  2-3 Tbsp  Pads used to help with leaking:      Yes, 1 pantiliner per day    Bowel History:   Frequency of bowel movement:  2x per day  Consistency of stool:    Soft  Ignores the urge to defecate:  Sometimes  Other bowel issues:  Urgency  Length of time spent trying to have a bowel movement:      Sexual Function History:  Sexual orientation:    Straight  Sexually active:  Yes  Lubrication used:    Yes  Pelvic pain:    Use of tampon  Pain or difficulty with orgasms/erection/ejaculation:    No  State of menopause:   Not sure, Perimenopause (have not gone through menopause yet)  Hormone medications:    No         Discussed reason for referral regarding pelvic health needs and external/internal pelvic floor muscle examination with patient/guardian.  Opportunity provided to ask questions and verbal consent for assessment and intervention was given.    POSTURE: Standing Posture: Forward head, Lordosis increased  Sitting Posture: Forward head  LUMBAR SCREEN: AROM WFL  HIP SCREEN:  Strength: WFL   Functional Strength Testing: WNL for all transitional mvmts    PELVIC/SI SCREEN:  WFL   PAIN PROVOCATION TEST: WFL  PELVIS/SI SPECIAL TESTS: WFL  BREATHING SYMMETRY: Rib cage flaring    PELVIC EXAM  Deferred today based on time constraints of session.      ABDOMINAL ASSESSMENT  Diastasis Rectus Abdominis (LUIS FERNANDO):  Not observed today    Abdominal Activation/Strength:  WFL for all transitions    Fascial Tension/Restriction:  not yet formally assessed    DERMATOMES: WNL    Assessment & Plan   CLINICAL " IMPRESSIONS  Medical Diagnosis: Mixed stress and urge urinary incontinence    Treatment Diagnosis: Pelvid Floor Muscle Dysfunction   Impression/Assessment: Patient is a 33 year old female with primarily stress UI and additional urge UI complaints.  The following significant findings have been identified: Decreased strength, Impaired sensation, Impaired muscle performance, and Decreased activity tolerance. These impairments interfere with their ability to perform self care tasks, recreational activities, and household chores as compared to previous level of function.     Clinical Decision Making (Complexity):  Clinical Presentation: Unstable/Unpredictable   Clinical Presentation Rationale: based on medical and personal factors listed in PT evaluation  Clinical Decision Making (Complexity): Moderate complexity    PLAN OF CARE  Treatment Interventions:  Modalities: Biofeedback, E-stim, Ultrasound  Interventions: Manual Therapy, Neuromuscular Re-education, Therapeutic Activity, Therapeutic Exercise, Self-Care/Home Management    Long Term Goals     PT Goal 1  Goal Identifier: STG  Goal Description: 1) Pt will report 2/7 days dry, in 4 weeks.  Target Date: 04/10/24  PT Goal 2  Goal Identifier: STG  Goal Description: 2)Pt will report making it to bathroom without leaking on way for 75% of all urges in 4 weeks.  Target Date: 04/10/24  PT Goal 3  Goal Identifier: LTG  Goal Description: 3)Pt will report 4/7 days dry in 8 weeks.  Target Date: 05/08/24  PT Goal 4  Goal Identifier: LTG  Goal Description: 4)Pt will report void times of every 2-3 hours without strong and emergent urges, in 8 weeks.  Target Date: 05/08/24  PT Goal 5  Goal Identifier: LTG  Goal Description: 5)Pt will be indep in HEP to prevent return of symptoms in 8 weeks.  Target Date: 05/08/24      Frequency of Treatment: 1x per week and weaning to every other week  Duration of Treatment: 8 weeks    Recommended Referrals to Other Professionals:  none  Education  Assessment:   Learner/Method: Patient;Listening;Reading;Demonstration;Pictures/Video;No Barriers to Learning    Risks and benefits of evaluation/treatment have been explained.   Patient/Family/caregiver agrees with Plan of Care.     Evaluation Time:     PT Eval, Moderate Complexity Minutes (31190): 25   Present: Not applicable     Signing Clinician: MARINA Jorgensen [facility]                                                                                   OUTPATIENT PHYSICAL THERAPY      PLAN OF TREATMENT FOR OUTPATIENT REHABILITATION   Patient's Last Name, First Name, Mary Lewis YOB: 1990   Provider's Name   [facility]   Medical Record No.  [id number]     Onset Date: 01/19/24  Start of Care Date: 03/13/24     Medical Diagnosis:  Mixed stress and urge urinary incontinence      PT Treatment Diagnosis:  Pelvid Floor Muscle Dysfunction Plan of Treatment  Frequency/Duration: 1x per week and weaning to every other week/ 8 weeks    Certification date from 03/13/24 to 05/08/24         See note for plan of treatment details and functional goals     Raisa Canales, PT                         I CERTIFY THE NEED FOR THESE SERVICES FURNISHED UNDER        THIS PLAN OF TREATMENT AND WHILE UNDER MY CARE     (Physician attestation of this document indicates review and certification of the therapy plan).              Referring Provider:  Kalyani Cochran    Initial Assessment  See Epic Evaluation- Start of Care Date: 03/13/24

## 2024-04-17 ENCOUNTER — THERAPY VISIT (OUTPATIENT)
Dept: PHYSICAL THERAPY | Facility: CLINIC | Age: 34
End: 2024-04-17
Attending: STUDENT IN AN ORGANIZED HEALTH CARE EDUCATION/TRAINING PROGRAM
Payer: COMMERCIAL

## 2024-04-17 DIAGNOSIS — N39.46 MIXED STRESS AND URGE URINARY INCONTINENCE: Primary | ICD-10-CM

## 2024-04-17 PROCEDURE — 97535 SELF CARE MNGMENT TRAINING: CPT | Mod: GP | Performed by: PHYSICAL THERAPIST

## 2024-04-17 PROCEDURE — 97750 PHYSICAL PERFORMANCE TEST: CPT | Mod: GP | Performed by: PHYSICAL THERAPIST

## 2024-04-17 PROCEDURE — 97110 THERAPEUTIC EXERCISES: CPT | Mod: GP | Performed by: PHYSICAL THERAPIST

## 2024-05-14 ENCOUNTER — TELEPHONE (OUTPATIENT)
Dept: FAMILY MEDICINE | Facility: CLINIC | Age: 34
End: 2024-05-14
Payer: COMMERCIAL

## 2024-05-14 NOTE — TELEPHONE ENCOUNTER
Patient Quality Outreach    Patient is due for the following:   Cervical Cancer Screening - PAP Needed    Next Steps:   Schedule a Adult Preventative    Type of outreach:    Sent [employer] message.      Questions for provider review:    None           Hazel Kinney, CMA

## 2024-06-29 ENCOUNTER — HEALTH MAINTENANCE LETTER (OUTPATIENT)
Age: 34
End: 2024-06-29

## 2024-08-20 ENCOUNTER — TELEPHONE (OUTPATIENT)
Dept: FAMILY MEDICINE | Facility: CLINIC | Age: 34
End: 2024-08-20
Payer: COMMERCIAL

## 2024-08-20 NOTE — TELEPHONE ENCOUNTER
Patient Quality Outreach    Patient is due for the following:   Cervical Cancer Screening - PAP Needed    Next Steps:   Schedule a Adult Preventative    Type of outreach:    Sent letter.      Questions for provider review:    None           Hazel Kinney, [facility]

## 2024-08-20 NOTE — LETTER
August 20, 2024    To  Mary Morrison  [id number] [address] [address]    Your team at [facility] cares about your health. We have reviewed your chart and based on our findings; we are making the following recommendations to better manage your health.     You are in particular need of attention regarding the following:     Schedule a primary care office visit with your provider for a Pap Smear to screen for Cervical Cancer.    If you have already completed these items, please contact the clinic via phone or   [facility] so your care team can review and update your records. Thank you for   choosing [facility] for your healthcare needs. For any questions,   concerns, or to schedule an appointment please contact our clinic.    Healthy Regards,      Your [facility] Care Team

## 2024-11-15 ENCOUNTER — HOSPITAL ENCOUNTER (EMERGENCY)
Facility: CLINIC | Age: 34
Discharge: HOME | End: 2024-11-15
Attending: PHYSICIAN ASSISTANT

## 2024-11-15 VITALS — HEART RATE: 102 BPM | TEMPERATURE: 98.4 F | RESPIRATION RATE: 18 BRPM | OXYGEN SATURATION: 97 %

## 2024-11-15 DIAGNOSIS — J02.9 PHARYNGITIS: ICD-10-CM

## 2024-11-15 LAB — GROUP A STREP BY PCR: NOT DETECTED

## 2024-11-15 PROCEDURE — 87651 STREP A DNA AMP PROBE: CPT | Performed by: PHYSICIAN ASSISTANT

## 2024-11-15 PROCEDURE — G0463 HOSPITAL OUTPT CLINIC VISIT: HCPCS | Performed by: PHYSICIAN ASSISTANT

## 2024-11-15 PROCEDURE — 99213 OFFICE O/P EST LOW 20 MIN: CPT | Performed by: PHYSICIAN ASSISTANT

## 2024-11-15 ASSESSMENT — ACTIVITIES OF DAILY LIVING (ADL): ADLS_ACUITY_SCORE: 0

## 2024-11-15 NOTE — ED PROVIDER NOTES
History     Chief Complaint   Patient presents with    Pharyngitis     HPI  Mary Morrison is a 34 year old female who presents to urgent care requesting testing for strep throat after had exposure to individual who tested positive approximately 2 weeks ago.  Patient does complain of mild sore throat.  She denies any fever, chills, myalgias, significant nasal congestion, cough, dyspnea, wheezing, vomiting, diarrhea or abdominal complaints.  She has not attempted any OTC treatments.  She does smoke tobacco.      Allergies:  Allergies   Allergen Reactions    Ketorolac Tromethamine Other (See Comments)     Comment: vomiting , Description:     Morphine Other (See Comments)     Comment: Confusion, Description:      Problem List:    Patient Active Problem List    Diagnosis Date Noted    Mixed stress and urge urinary incontinence 03/13/2024     Priority: Medium    Vitamin D deficiency 01/10/2024     Priority: Medium    Low iron 01/10/2024     Priority: Medium    Anxiety 08/01/2011     Priority: Medium    Psychiatric disorders 07/23/2011     Priority: Medium     1. Major depressive disorder, recurrent, severe without psychotic features. 2. Alcohol dependency versus abuse.   AXIS II: Borderline personality disorder.   AXIS III: Self-inflicted cuts on wrist.   AXIS IV: Long history of mental illness.   AXIS V: Current Global Assessment of Functioning of 50.  Problem list name updated by automated process. Provider to review and confirm      CARDIOVASCULAR SCREENING; LDL GOAL LESS THAN 160 03/22/2011     Priority: Medium    Thyroid enlargement 01/06/2011     Priority: Medium    Knee pain 09/28/2010     Priority: Medium    LGSIL on Pap smear 01/03/2010     Priority: Medium     12/22/09:Pap--LSIL. Repeat pap PP. (ALAYNA 7/17/10).  8/23/10:pap--LSIL. Recommend colposcopy.  9/21/10:Colpo--benign. Repeat pap 6 months. Reminder placed in EPIC.  1/6/11: Pap - NIL. Repeat pap postpartum  9/6/11:Pap--NIL. Rpt pap in 1 year with  AFE. In HM, ep, prob list, hx updated. Reminder sent.    13: Patient failed follow-up. Pap tracking file closed.             Past Medical History:    Past Medical History:   Diagnosis Date    Anxiety     Chickenpox     Papanicolaou smear of cervix with low grade squamous intraepithelial lesion (LGSIL) ,     Urinary tract infections      Past Surgical History:    Past Surgical History:   Procedure Laterality Date    ARTHROSCOPY OF JOINT UNLISTED       SECTION  2011    Procedure: SECTION;  Section; Surgeon:ALIZA AMADOR; Location:[place] OR     TOOTH EXTRACTION W/FORCEP       Family History:    Family History   Problem Relation Age of Onset    C.A.D. Father         stents    Hypertension Father     Cancer Father         testicular cancer    Alcohol/Drug Father         alcohol & drug    Depression Father     Genitourinary Problems Father         kidney stone history    Psychotic Disorder Father     Gynecology Mother         hysterectomy-endometreosis    Hypertension Maternal Grandmother     Breast Cancer Maternal Grandmother     Arthritis Maternal Grandmother     Gynecology Maternal Grandmother         hysterectomy    Hypertension Maternal Grandfather     Arthritis Maternal Grandfather     Arthritis Paternal Grandmother     Osteoporosis Paternal Grandmother     Asthma Paternal Grandfather     Hypertension Paternal Grandfather     Arthritis Paternal Grandfather     Lipids Paternal Grandfather     Obesity Paternal Grandfather     Respiratory Paternal Grandfather         COPD    Cancer - colorectal Paternal Grandfather     Asthma Brother      Social History:  Marital Status:  Single [1]  Social History     Tobacco Use    Smoking status: Every Day     Current packs/day: 1.00     Average packs/day: 1 pack/day for 3.0 years (3.0 ttl pk-yrs)     Types: Cigarettes    Smokeless tobacco: Never    Tobacco comments:     10-12 cigarettes a day    Vaping Use    Vaping status: Never Used    Substance Use Topics    Alcohol use: No    Drug use: No      Medications:    B Complex-Biotin-FA (B COMPLETE PO)  ferrous sulfate (FE TABS) 325 (65 Fe) MG EC tablet  solifenacin (VESICARE) 5 MG tablet  UNABLE TO FIND  Vitamin D3 (CHOLECALCIFEROL) 25 mcg (1000 units) tablet      Review of Systems  CONSTITUTIONAL:NEGATIVE for fever, chills, change in weight  INTEGUMENTARY/SKIN: NEGATIVE for worrisome rashes, moles or lesions  EYES: NEGATIVE for vision changes or irritation  ENT/MOUTH: NEGATIVE for ear, mouth and throat problems  RESP:NEGATIVE for significant cough or SOB  GI: NEGATIVE for nausea, abdominal pain, heartburn, or change in bowel habits  Physical Exam   Pulse: 102  Temp: 98.4  F (36.9  C)  Resp: 18  SpO2: 97 %  Physical Exam  GENERAL APPEARANCE: healthy, alert and no distress  EYES: EOMI,  PERRL, conjunctiva clear  HENT: ear canals and TM's normal.  Mild pharyngeal erythema.  No exudate  NECK: supple, nontender, no lymphadenopathy  RESP: lungs clear to auscultation - no rales, rhonchi or wheezes  CV: regular rates and rhythm, normal S1 S2, no murmur noted  SKIN: no suspicious lesions or rashes  ED Course        Procedures       Critical Care time:  none     Results for orders placed or performed during the hospital encounter of 11/15/24 (from the past 24 hours)   Group A Streptococcus PCR Throat Swab    Specimen: Throat; Swab   Result Value Ref Range    Group A strep by PCR Not Detected Not Detected    Narrative    The Xpert Xpress Strep A test, performed on the [employer]  Instrument Systems, is a rapid, qualitative in vitro diagnostic test for the detection of Streptococcus pyogenes (Group A ß-hemolytic Streptococcus, Strep A) in throat swab specimens from patients with signs and symptoms of pharyngitis. The Xpert Xpress Strep A test can be used as an aid in the diagnosis of Group A Streptococcal pharyngitis. The assay is not intended to monitor treatment for Group A Streptococcus infections. The Xpert  Xpress Strep A test utilizes an automated real-time polymerase chain reaction (PCR) to detect Streptococcus pyogenes DNA.     Medications - No data to display    Assessments & Plan (with Medical Decision Making)     I have reviewed the nursing notes.    I have reviewed the findings, diagnosis, plan and need for follow up with the patient.       Discharge Medication List as of 11/15/2024  1:50 PM        Final diagnoses:   Pharyngitis     Strep PCR testing negative.  No evidence of peritonsillar cellulitis or abscess.   I do not suspect mono at this time.   She was instructed to continue OTC symptomatic treatment.  Follow up with PCP if no improvement in 5-7 days.  Worrisome reasons to seek care sooner discussed.        11/15/2024   [facility] EMERGENCY DEPT       Mckayla Cardoso PA-C  11/15/24 0411

## 2024-11-26 ENCOUNTER — TELEPHONE (OUTPATIENT)
Dept: FAMILY MEDICINE | Facility: CLINIC | Age: 34
End: 2024-11-26

## 2025-01-06 PROBLEM — N39.46 MIXED STRESS AND URGE URINARY INCONTINENCE: Status: RESOLVED | Noted: 2024-03-13 | Resolved: 2025-01-06

## 2025-06-10 ENCOUNTER — APPOINTMENT (OUTPATIENT)
Dept: GENERAL RADIOLOGY | Facility: CLINIC | Age: 35
End: 2025-06-10
Payer: COMMERCIAL

## 2025-06-10 ENCOUNTER — HOSPITAL ENCOUNTER (EMERGENCY)
Facility: CLINIC | Age: 35
Discharge: HOME | End: 2025-06-10
Payer: COMMERCIAL

## 2025-06-10 VITALS
SYSTOLIC BLOOD PRESSURE: 115 MMHG | RESPIRATION RATE: 16 BRPM | HEART RATE: 74 BPM | WEIGHT: 160 LBS | DIASTOLIC BLOOD PRESSURE: 87 MMHG | OXYGEN SATURATION: 99 % | TEMPERATURE: 98.9 F | BODY MASS INDEX: 30.23 KG/M2

## 2025-06-10 DIAGNOSIS — M79.672 LEFT FOOT PAIN: Primary | ICD-10-CM

## 2025-06-10 PROCEDURE — 73610 X-RAY EXAM OF ANKLE: CPT | Mod: LT

## 2025-06-10 PROCEDURE — 99283 EMERGENCY DEPT VISIT LOW MDM: CPT

## 2025-06-10 PROCEDURE — 250N000011 HC RX IP 250 OP 636

## 2025-06-10 PROCEDURE — 250N000013 HC RX MED GY IP 250 OP 250 PS 637

## 2025-06-10 PROCEDURE — 90715 TDAP VACCINE 7 YRS/> IM: CPT

## 2025-06-10 PROCEDURE — 99283 EMERGENCY DEPT VISIT LOW MDM: CPT | Mod: 25

## 2025-06-10 PROCEDURE — 73630 X-RAY EXAM OF FOOT: CPT | Mod: LT

## 2025-06-10 PROCEDURE — 90471 IMMUNIZATION ADMIN: CPT

## 2025-06-10 RX ORDER — OXYCODONE HYDROCHLORIDE 5 MG/1
5 TABLET ORAL EVERY 6 HOURS PRN
Qty: 6 TABLET | Refills: 0 | Status: SHIPPED | OUTPATIENT
Start: 2025-06-10

## 2025-06-10 RX ORDER — OXYCODONE HYDROCHLORIDE 5 MG/1
5 TABLET ORAL ONCE
Refills: 0 | Status: COMPLETED | OUTPATIENT
Start: 2025-06-10 | End: 2025-06-10

## 2025-06-10 RX ORDER — ACETAMINOPHEN 500 MG
1000 TABLET ORAL ONCE
Status: COMPLETED | OUTPATIENT
Start: 2025-06-10 | End: 2025-06-10

## 2025-06-10 RX ADMIN — CLOSTRIDIUM TETANI TOXOID ANTIGEN (FORMALDEHYDE INACTIVATED), CORYNEBACTERIUM DIPHTHERIAE TOXOID ANTIGEN (FORMALDEHYDE INACTIVATED), BORDETELLA PERTUSSIS TOXOID ANTIGEN (GLUTARALDEHYDE INACTIVATED), BORDETELLA PERTUSSIS FILAMENTOUS HEMAGGLUTININ ANTIGEN (FORMALDEHYDE INACTIVATED), BORDETELLA PERTUSSIS PERTACTIN ANTIGEN, AND BORDETELLA PERTUSSIS FIMBRIAE 2/3 ANTIGEN 0.5 ML: 5; 2; 2.5; 5; 3; 5 INJECTION, SUSPENSION INTRAMUSCULAR at 20:40

## 2025-06-10 RX ADMIN — ACETAMINOPHEN 1000 MG: 500 TABLET, FILM COATED ORAL at 20:40

## 2025-06-10 RX ADMIN — OXYCODONE 5 MG: 5 TABLET ORAL at 20:40

## 2025-06-10 ASSESSMENT — ACTIVITIES OF DAILY LIVING (ADL)
ADLS_ACUITY_SCORE: 41
ADLS_ACUITY_SCORE: 41

## 2025-06-10 ASSESSMENT — COLUMBIA-SUICIDE SEVERITY RATING SCALE - C-SSRS
6. HAVE YOU EVER DONE ANYTHING, STARTED TO DO ANYTHING, OR PREPARED TO DO ANYTHING TO END YOUR LIFE?: NO
1. IN THE PAST MONTH, HAVE YOU WISHED YOU WERE DEAD OR WISHED YOU COULD GO TO SLEEP AND NOT WAKE UP?: NO
2. HAVE YOU ACTUALLY HAD ANY THOUGHTS OF KILLING YOURSELF IN THE PAST MONTH?: NO

## 2025-06-11 ENCOUNTER — OFFICE VISIT (OUTPATIENT)
Dept: PODIATRY | Facility: CLINIC | Age: 35
End: 2025-06-11
Payer: COMMERCIAL

## 2025-06-11 VITALS — HEIGHT: 60 IN | WEIGHT: 156 LBS | BODY MASS INDEX: 30.63 KG/M2

## 2025-06-11 DIAGNOSIS — M25.572 SINUS TARSITIS, LEFT: ICD-10-CM

## 2025-06-11 DIAGNOSIS — S93.602A FOOT SPRAIN, LEFT, INITIAL ENCOUNTER: Primary | ICD-10-CM

## 2025-06-11 DIAGNOSIS — M79.672 LEFT FOOT PAIN: ICD-10-CM

## 2025-06-11 PROCEDURE — 99204 OFFICE O/P NEW MOD 45 MIN: CPT | Performed by: STUDENT IN AN ORGANIZED HEALTH CARE EDUCATION/TRAINING PROGRAM

## 2025-06-11 PROCEDURE — 1125F AMNT PAIN NOTED PAIN PRSNT: CPT | Performed by: STUDENT IN AN ORGANIZED HEALTH CARE EDUCATION/TRAINING PROGRAM

## 2025-06-11 ASSESSMENT — PAIN SCALES - GENERAL: PAINLEVEL_OUTOF10: MODERATE PAIN (6)

## 2025-06-11 NOTE — LETTER
2025      Mary Morrison  [address] [facility] [address]      Dear Colleague,    Thank you for referring your patient, Mary Morrison, to the [facility]. Please see a copy of my visit note below.    CC: Left foot sprain     HPI: Mary Morrison is a 34 year old female who presents to clinic for chief concern of left ankle sprain.  Patient states that she is not sure which direction she rolled her ankle but she does remember wearing her ankle while she was in heels, showing an inversion injury.  Patient states she was somewhat able to walk but is now having pain to the outside of the left foot and top of the left foot.  Additionally she fell in prescription her right knee.  She states that she was given crutches but is unable to utilize these and has been ambulating in the cam boot.  She states that this is very difficult for her and she is wondering if she can get a knee scooter today.    Past Surgical History:   Procedure Laterality Date     ARTHROSCOPY OF JOINT UNLISTED        SECTION  2011    Procedure: SECTION;  Section; Surgeon:ALIZA AMADOR; Location:[place] OR      TOOTH EXTRACTION W/FORCEP       Past Medical History:   Diagnosis Date     Anxiety      Chickenpox      Papanicolaou smear of cervix with low grade squamous intraepithelial lesion (LGSIL) ,     [place] () Benign     Urinary tract infections      Medications:  Current Outpatient Medications   Medication Sig Dispense Refill     B Complex-Biotin-FA (B COMPLETE PO)        ferrous sulfate (FE TABS) 325 (65 Fe) MG EC tablet Take 1 tablet (325 mg) by mouth every other day 45 tablet 0     oxyCODONE (ROXICODONE) 5 MG tablet Take 1 tablet (5 mg) by mouth every 6 hours as needed for severe pain. 6 tablet 0     solifenacin (VESICARE) 5 MG tablet Take 1 tablet (5 mg) by mouth daily 90 tablet 1     UNABLE TO FIND MEDICATION NAME: mushroom suppliment       Vitamin  D3 (CHOLECALCIFEROL) 25 mcg (1000 units) tablet Take 1 tablet (25 mcg) by mouth daily 90 tablet 0     Allergies:  Ketorolac tromethamine and Morphine  Social History     Socioeconomic History     Marital status: Single     Spouse name: Not on file     Number of children: 2     Years of education: Not on file     Highest education level: Not on file   Occupational History     Employer: [employer]     Employer: [employer]   Tobacco Use     Smoking status: Every Day     Current packs/day: 1.00     Average packs/day: 1 pack/day for 3.0 years (3.0 ttl pk-yrs)     Types: Cigarettes     Smokeless tobacco: Never     Tobacco comments:     10-12 cigarettes a day    Vaping Use     Vaping status: Never Used   Substance and Sexual Activity     Alcohol use: No     Drug use: No     Sexual activity: Yes     Partners: Male   Other Topics Concern     Parent/sibling w/ CABG, MI or angioplasty before 65F 55M? Yes     Comment: father-stents   Social History Narrative     Not on file     Social Drivers of Health     Financial Resource Strain: Low Risk  (1/9/2024)    Financial Resource Strain      Within the past 12 months, have you or your family members you live with been unable to get utilities (heat, electricity) when it was really needed?: No   Food Insecurity: Low Risk  (1/9/2024)    Food Insecurity      Within the past 12 months, did you worry that your food would run out before you got money to buy more?: No      Within the past 12 months, did the food you bought just not last and you didn t have money to get more?: No   Transportation Needs: Low Risk  (1/9/2024)    Transportation Needs      Within the past 12 months, has lack of transportation kept you from medical appointments, getting your medicines, non-medical meetings or appointments, work, or from getting things that you need?: No   Physical Activity: Not on file   Stress: Not on file   Social Connections: Unknown (1/1/2022)    Received from [employer] [facility]    Social Connections      Frequency of Communication with Friends and Family: Not on file   Interpersonal Safety: Low Risk  (1/9/2024)    Interpersonal Safety      Do you feel physically and emotionally safe where you currently live?: Yes      Within the past 12 months, have you been hit, slapped, kicked or otherwise physically hurt by someone?: No      Within the past 12 months, have you been humiliated or emotionally abused in other ways by your partner or ex-partner?: No   Housing Stability: Low Risk  (1/9/2024)    Housing Stability      Do you have housing? : Yes      Are you worried about losing your housing?: No     Family History   Problem Relation Age of Onset     C.A.D. Father         stents     Hypertension Father      Cancer Father         testicular cancer     Alcohol/Drug Father         alcohol & drug     Depression Father      Genitourinary Problems Father         kidney stone history     Psychotic Disorder Father      Gynecology Mother         hysterectomy-endometreosis     Hypertension Maternal Grandmother      Breast Cancer Maternal Grandmother      Arthritis Maternal Grandmother      Gynecology Maternal Grandmother         hysterectomy     Hypertension Maternal Grandfather      Arthritis Maternal Grandfather      Arthritis Paternal Grandmother      Osteoporosis Paternal Grandmother      Asthma Paternal Grandfather      Hypertension Paternal Grandfather      Arthritis Paternal Grandfather      Lipids Paternal Grandfather      Obesity Paternal Grandfather      Respiratory Paternal Grandfather         COPD     Cancer - colorectal Paternal Grandfather      Asthma Brother        Medical records were reviewed and are summarized above.    Review of Systems    PHYSICAL EXAM:  Focused lower extremity physical exam:     Derm: Skin is warm, dry, intact. No open wounds, laceration or abrasions noted. Nails are well trimmed. No ecchymosis or erythema noted.  No ecchymosis to the dorsal or  plantar aspect of the Lisfranc ligament.    Vasc: Dorsalis pedis pulses, 2/4 bilateral. Posterior tibial pulses 2/4 bilateral. Cap fill time < 3 seconds to the digits.  Mild nonpitting edema present to the hindfoot, left. Hair growth present to the toes.     Neuro: Protective sensation intact via light touch to the feet. Gross sensation intact.     MSK:   - Tenderness to palpation to the sinus tarsi, left  - Pain to palpation at the bifurcate ligament, left foot.  Pain with inversion to the subtalar joint at the location of the bifurcate ligament and sinus tarsi.  - Muscle strength 5/5 with dorsiflexion, plantarflexion, eversion, inversion of the feet. Compartments soft and compressible.    Imaging: 3 views of the left foot and ankle were evaluated from 06/10/2025.  No acute fractures, subluxations, dislocations noted.  No foreign fragment sign nor displacement or widening to the Lisfranc ligament.    Assessment:  - Sprain of the bifurcate ligament, left foot  - Sinus tarsitis, left    Plan:  - Patient was seen and evaluated in clinic by myself.   - 3 views of the left foot were evaluated from 06/10/2025  - 3 views of the left ankle were evaluated from 06/10/2025  - Urgent care note was evaluated and reviewed from 06/10/2025    - Sprain left foot, initial:  Discussed the anatomy for the bifurcate ligament.  Discussed the sprain and the inversion injury that she underwent.  Discussed the healing cascade for ligaments of the foot.  Discussed continued RICE, NSAID, use of cam boot, and rolling knee scooter as needed for ambulation over the next 6 weeks.    - Sinus tarsitis, left:  Discussed the sinus tarsi and sinus tarsitis.  Discussed the inversion injury.  Discussed RICE, therapeutic injection, compression, ankle sleeve for single brace, continue use of the cam boot.  Patient will continue with the cam boot at this time.    - Patient to follow-up in 4 to 6 weeks or sooner should problems arise.    Koby  NATALIE Hidalgo    Again, thank you for allowing me to participate in the care of your patient.        Sincerely,        Koby Hidalgo DPM    Electronically signed

## 2025-06-11 NOTE — ED TRIAGE NOTES
Left ankle pain after rolling it while wearing heels.   Injury occurred at 1800.  Abrasion to right knee       Triage Assessment (Adult)       Row Name 06/10/25 2007          Triage Assessment    Airway WDL WDL        Respiratory WDL    Respiratory WDL WDL        Skin Circulation/Temperature WDL    Skin Circulation/Temperature WDL WDL        Cardiac WDL    Cardiac WDL WDL        Peripheral/Neurovascular WDL    Peripheral Neurovascular WDL WDL        Cognitive/Neuro/Behavioral WDL    Cognitive/Neuro/Behavioral WDL WDL

## 2025-06-11 NOTE — PATIENT INSTRUCTIONS
Please call one of the [facility] locations below to schedule an appointment. If you received a prescription please bring it with you to your appointment. Some locations are limited to what they carry.    Office Locations    [facility]  [address] [place] [address]  Home Medical Equipment, [address]   Phone: [phone]   Orthotics and Prosthetics, [address]   Phone: [phone]    [facility]   Home Medical Equipment   [address], [place] [address]  Phone :[phone]  Orthotics and Prosthetics   [address], [facility] [address]  Phone:[phone]          [facility] [place]  [address]  [address]   [place] [address]   Phone: [phone]    [facility] Bldg[facility]  [address]  [place] [address]  Phone: [phone]    [facility]  [address]  [place] [address]  Phone: [phone]  Fax: [phone]    [facility] Bldg.   [address]  [place] [address]  Phone: [phone]    [facility]  [address]  [place] [address]  Phone: [phone]    [facility]  [address]  [address]  [place] [address]  Phone: [phone]    [place]   [address] [place] [address]   Phone: [phone]

## 2025-06-11 NOTE — DISCHARGE INSTRUCTIONS
You were seen in the emergency department today for left foot and ankle pain. We did tests including x-rays that showed no broken bones or dislocations.  However, given the amount of pain you are having we did still place you in a walking boot and give you crutches.     The next step is to meet with the foot and ankle doctors in clinic in the next 1 to 2 weeks for ongoing evaluation and management of your symptoms.  I have given you a referral to meet with them.     In the meantime, you can take acetaminophen (Tylenol) or ibuprofen for your pain. You can alternate these every three hours as needed. So, for example, you could take acetaminophen at noon, then ibuprofen at 3pm, then acetaminophen again at 6pm, and so on. Do not take more Tylenol or ibuprofen than the daily maximum dose as indicated on the bottle.    I am also sending you with a prescription for a medication called oxycodone. This is a powerful pain medicine you can take as needed for pain. You should first try acetaminophen (Tylenol) or ibuprofen to help with pain, and only take oxycodone if the first medication does not help. Take this medication only as prescribed on the bottle. Do not drink alcohol, operate heavy machinery (such as a car), or make important life decisions while taking this medication.     Return to the emergency department with new or worsening symptoms that you find concerning.

## 2025-06-13 NOTE — PROGRESS NOTES
CC: Left foot sprain     HPI: Mary Morrison is a 34 year old female who presents to clinic for chief concern of left ankle sprain.  Patient states that she is not sure which direction she rolled her ankle but she does remember wearing her ankle while she was in heels, showing an inversion injury.  Patient states she was somewhat able to walk but is now having pain to the outside of the left foot and top of the left foot.  Additionally she fell in prescription her right knee.  She states that she was given crutches but is unable to utilize these and has been ambulating in the cam boot.  She states that this is very difficult for her and she is wondering if she can get a knee scooter today.    Past Surgical History:   Procedure Laterality Date    ARTHROSCOPY OF JOINT UNLISTED       SECTION  2011    Procedure: SECTION;  Section; Surgeon:ALIZA AMADOR; Location:[place] OR     TOOTH EXTRACTION W/FORCEP       Past Medical History:   Diagnosis Date    Anxiety     Chickenpox     Papanicolaou smear of cervix with low grade squamous intraepithelial lesion (LGSIL) ,     [place] () Benign    Urinary tract infections      Medications:  Current Outpatient Medications   Medication Sig Dispense Refill    B Complex-Biotin-FA (B COMPLETE PO)       ferrous sulfate (FE TABS) 325 (65 Fe) MG EC tablet Take 1 tablet (325 mg) by mouth every other day 45 tablet 0    oxyCODONE (ROXICODONE) 5 MG tablet Take 1 tablet (5 mg) by mouth every 6 hours as needed for severe pain. 6 tablet 0    solifenacin (VESICARE) 5 MG tablet Take 1 tablet (5 mg) by mouth daily 90 tablet 1    UNABLE TO FIND MEDICATION NAME: mushroom suppliment      Vitamin D3 (CHOLECALCIFEROL) 25 mcg (1000 units) tablet Take 1 tablet (25 mcg) by mouth daily 90 tablet 0     Allergies:  Ketorolac tromethamine and Morphine  Social History     Socioeconomic History    Marital status: Single     Spouse name: Not on file    Number of  children: 2    Years of education: Not on file    Highest education level: Not on file   Occupational History     Employer: [employer]     Employer: [employer]   Tobacco Use    Smoking status: Every Day     Current packs/day: 1.00     Average packs/day: 1 pack/day for 3.0 years (3.0 ttl pk-yrs)     Types: Cigarettes    Smokeless tobacco: Never    Tobacco comments:     10-12 cigarettes a day    Vaping Use    Vaping status: Never Used   Substance and Sexual Activity    Alcohol use: No    Drug use: No    Sexual activity: Yes     Partners: Male   Other Topics Concern    Parent/sibling w/ CABG, MI or angioplasty before 65F 55M? Yes     Comment: father-stents   Social History Narrative    Not on file     Social Drivers of Health     Financial Resource Strain: Low Risk  (1/9/2024)    Financial Resource Strain     Within the past 12 months, have you or your family members you live with been unable to get utilities (heat, electricity) when it was really needed?: No   Food Insecurity: Low Risk  (1/9/2024)    Food Insecurity     Within the past 12 months, did you worry that your food would run out before you got money to buy more?: No     Within the past 12 months, did the food you bought just not last and you didn t have money to get more?: No   Transportation Needs: Low Risk  (1/9/2024)    Transportation Needs     Within the past 12 months, has lack of transportation kept you from medical appointments, getting your medicines, non-medical meetings or appointments, work, or from getting things that you need?: No   Physical Activity: Not on file   Stress: Not on file   Social Connections: Unknown (1/1/2022)    Received from [facility]    Social Connections     Frequency of Communication with Friends and Family: Not on file   Interpersonal Safety: Low Risk  (1/9/2024)    Interpersonal Safety     Do you feel physically and emotionally safe where you currently live?: Yes     Within the  past 12 months, have you been hit, slapped, kicked or otherwise physically hurt by someone?: No     Within the past 12 months, have you been humiliated or emotionally abused in other ways by your partner or ex-partner?: No   Housing Stability: Low Risk  (1/9/2024)    Housing Stability     Do you have housing? : Yes     Are you worried about losing your housing?: No     Family History   Problem Relation Age of Onset    C.A.D. Father         stents    Hypertension Father     Cancer Father         testicular cancer    Alcohol/Drug Father         alcohol & drug    Depression Father     Genitourinary Problems Father         kidney stone history    Psychotic Disorder Father     Gynecology Mother         hysterectomy-endometreosis    Hypertension Maternal Grandmother     Breast Cancer Maternal Grandmother     Arthritis Maternal Grandmother     Gynecology Maternal Grandmother         hysterectomy    Hypertension Maternal Grandfather     Arthritis Maternal Grandfather     Arthritis Paternal Grandmother     Osteoporosis Paternal Grandmother     Asthma Paternal Grandfather     Hypertension Paternal Grandfather     Arthritis Paternal Grandfather     Lipids Paternal Grandfather     Obesity Paternal Grandfather     Respiratory Paternal Grandfather         COPD    Cancer - colorectal Paternal Grandfather     Asthma Brother        Medical records were reviewed and are summarized above.    Review of Systems    PHYSICAL EXAM:  Focused lower extremity physical exam:     Derm: Skin is warm, dry, intact. No open wounds, laceration or abrasions noted. Nails are well trimmed. No ecchymosis or erythema noted.  No ecchymosis to the dorsal or plantar aspect of the Lisfranc ligament.    Vasc: Dorsalis pedis pulses, 2/4 bilateral. Posterior tibial pulses 2/4 bilateral. Cap fill time < 3 seconds to the digits.  Mild nonpitting edema present to the hindfoot, left. Hair growth present to the toes.     Neuro: Protective sensation intact via light  touch to the feet. Gross sensation intact.     MSK:   - Tenderness to palpation to the sinus tarsi, left  - Pain to palpation at the bifurcate ligament, left foot.  Pain with inversion to the subtalar joint at the location of the bifurcate ligament and sinus tarsi.  - Muscle strength 5/5 with dorsiflexion, plantarflexion, eversion, inversion of the feet. Compartments soft and compressible.    Imaging: 3 views of the left foot and ankle were evaluated from 06/10/2025.  No acute fractures, subluxations, dislocations noted.  No foreign fragment sign nor displacement or widening to the Lisfranc ligament.    Assessment:  - Sprain of the bifurcate ligament, left foot  - Sinus tarsitis, left    Plan:  - Patient was seen and evaluated in clinic by myself.   - 3 views of the left foot were evaluated from 06/10/2025  - 3 views of the left ankle were evaluated from 06/10/2025  - Urgent care note was evaluated and reviewed from 06/10/2025    - Sprain left foot, initial:  Discussed the anatomy for the bifurcate ligament.  Discussed the sprain and the inversion injury that she underwent.  Discussed the healing cascade for ligaments of the foot.  Discussed continued RICE, NSAID, use of cam boot, and rolling knee scooter as needed for ambulation over the next 6 weeks.    - Sinus tarsitis, left:  Discussed the sinus tarsi and sinus tarsitis.  Discussed the inversion injury.  Discussed RICE, therapeutic injection, compression, ankle sleeve for single brace, continue use of the cam boot.  Patient will continue with the cam boot at this time.    - Patient to follow-up in 4 to 6 weeks or sooner should problems arise.    Koby Hidalgo DPM

## 2025-07-13 ENCOUNTER — HEALTH MAINTENANCE LETTER (OUTPATIENT)
Age: 35
End: 2025-07-13